# Patient Record
Sex: FEMALE | Race: WHITE | NOT HISPANIC OR LATINO | Employment: OTHER | URBAN - NONMETROPOLITAN AREA
[De-identification: names, ages, dates, MRNs, and addresses within clinical notes are randomized per-mention and may not be internally consistent; named-entity substitution may affect disease eponyms.]

---

## 2023-02-24 PROBLEM — I10 UNCONTROLLED HYPERTENSION: Status: ACTIVE | Noted: 2023-02-24

## 2023-02-24 PROBLEM — B35.1 MYCOTIC TOENAILS: Status: ACTIVE | Noted: 2023-02-24

## 2023-02-24 PROBLEM — K21.9 GERD (GASTROESOPHAGEAL REFLUX DISEASE): Status: ACTIVE | Noted: 2023-02-24

## 2023-02-24 PROBLEM — S39.012A LUMBAR STRAIN: Status: ACTIVE | Noted: 2023-02-24

## 2023-02-24 PROBLEM — M54.50 LUMBAR PAIN: Status: ACTIVE | Noted: 2023-02-24

## 2023-02-24 PROBLEM — M79.672 BILATERAL LEG AND FOOT PAIN: Status: ACTIVE | Noted: 2023-02-24

## 2023-02-24 PROBLEM — R29.6 RECURRENT FALLS: Status: ACTIVE | Noted: 2023-02-24

## 2023-02-24 PROBLEM — B37.2 CANDIDIASIS, INTERTRIGO: Status: ACTIVE | Noted: 2023-02-24

## 2023-02-24 PROBLEM — E03.9 HYPOTHYROIDISM: Status: ACTIVE | Noted: 2023-02-24

## 2023-02-24 PROBLEM — M25.561 RIGHT KNEE PAIN: Status: ACTIVE | Noted: 2023-02-24

## 2023-02-24 PROBLEM — L03.90 CELLULITIS: Status: ACTIVE | Noted: 2023-02-24

## 2023-02-24 PROBLEM — M85.80 OSTEOPENIA: Status: ACTIVE | Noted: 2023-02-24

## 2023-02-24 PROBLEM — E87.6 HYPOKALEMIA: Status: ACTIVE | Noted: 2023-02-24

## 2023-02-24 PROBLEM — F32.A DEPRESSION: Status: ACTIVE | Noted: 2023-02-24

## 2023-02-24 PROBLEM — I83.893 VARICOSE VEINS OF BILATERAL LOWER EXTREMITIES WITH OTHER COMPLICATIONS: Status: ACTIVE | Noted: 2023-02-24

## 2023-02-24 PROBLEM — M53.3 PAIN OF RIGHT SACROILIAC JOINT: Status: ACTIVE | Noted: 2023-02-24

## 2023-02-24 PROBLEM — M79.604 BILATERAL LEG AND FOOT PAIN: Status: ACTIVE | Noted: 2023-02-24

## 2023-02-24 PROBLEM — F32.4 MAJOR DEPRESSIVE DISORDER WITH SINGLE EPISODE, IN PARTIAL REMISSION (CMS-HCC): Status: ACTIVE | Noted: 2023-02-24

## 2023-02-24 PROBLEM — M79.605 BILATERAL LEG AND FOOT PAIN: Status: ACTIVE | Noted: 2023-02-24

## 2023-02-24 PROBLEM — E87.6 LOW SERUM POTASSIUM: Status: ACTIVE | Noted: 2023-02-24
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
## 2023-02-24 PROBLEM — K57.92 DIVERTICULITIS: Status: ACTIVE | Noted: 2023-02-24

## 2023-02-24 PROBLEM — S39.012A SACROILIAC STRAIN: Status: ACTIVE | Noted: 2023-02-24

## 2023-02-24 PROBLEM — F33.0 MILD EPISODE OF RECURRENT MAJOR DEPRESSIVE DISORDER (CMS-HCC): Status: ACTIVE | Noted: 2023-02-24

## 2023-02-24 PROBLEM — R51.9 HEADACHE: Status: ACTIVE | Noted: 2023-02-24

## 2023-02-24 PROBLEM — G47.00 INSOMNIA: Status: ACTIVE | Noted: 2023-02-24

## 2023-02-24 PROBLEM — S22.080A T12 COMPRESSION FRACTURE, INITIAL ENCOUNTER (MULTI): Status: ACTIVE | Noted: 2023-02-24

## 2023-02-24 PROBLEM — R25.2 LEG CRAMPING: Status: ACTIVE | Noted: 2023-02-24

## 2023-02-24 PROBLEM — M79.671 BILATERAL LEG AND FOOT PAIN: Status: ACTIVE | Noted: 2023-02-24

## 2023-02-24 PROBLEM — M17.11 PRIMARY OSTEOARTHRITIS OF RIGHT KNEE: Status: ACTIVE | Noted: 2023-02-24

## 2023-02-24 PROBLEM — M25.551 RIGHT HIP PAIN: Status: ACTIVE | Noted: 2023-02-24

## 2023-02-24 RX ORDER — CITALOPRAM 10 MG/1
1 TABLET ORAL DAILY
COMMUNITY
Start: 2016-08-26

## 2023-02-24 RX ORDER — LEVOTHYROXINE SODIUM 175 UG/1
1 TABLET ORAL
COMMUNITY
Start: 2014-06-23 | End: 2023-04-06 | Stop reason: SDUPTHER

## 2023-02-24 RX ORDER — TRAZODONE HYDROCHLORIDE 50 MG/1
TABLET ORAL
COMMUNITY
Start: 2022-01-20 | End: 2023-04-06 | Stop reason: SDUPTHER

## 2023-02-24 RX ORDER — ACETAMINOPHEN 500 MG
TABLET ORAL
COMMUNITY
Start: 2021-12-10 | End: 2023-07-27 | Stop reason: ALTCHOICE

## 2023-02-24 RX ORDER — POTASSIUM CHLORIDE 750 MG/1
1 TABLET, FILM COATED, EXTENDED RELEASE ORAL DAILY
COMMUNITY
Start: 2021-10-21 | End: 2024-01-24 | Stop reason: ALTCHOICE

## 2023-03-29 PROBLEM — F33.0 MILD EPISODE OF RECURRENT MAJOR DEPRESSIVE DISORDER (CMS-HCC): Status: RESOLVED | Noted: 2023-02-24 | Resolved: 2023-03-29

## 2023-04-06 ENCOUNTER — OFFICE VISIT (OUTPATIENT)
Dept: PRIMARY CARE | Facility: CLINIC | Age: 78
End: 2023-04-06
Payer: MEDICARE

## 2023-04-06 VITALS
SYSTOLIC BLOOD PRESSURE: 151 MMHG | HEART RATE: 81 BPM | DIASTOLIC BLOOD PRESSURE: 79 MMHG | WEIGHT: 111.8 LBS | HEIGHT: 65 IN | BODY MASS INDEX: 18.63 KG/M2 | RESPIRATION RATE: 18 BRPM | OXYGEN SATURATION: 98 %

## 2023-04-06 DIAGNOSIS — E87.6 LOW SERUM POTASSIUM: ICD-10-CM

## 2023-04-06 DIAGNOSIS — R03.0 ELEVATED BLOOD PRESSURE READING: ICD-10-CM

## 2023-04-06 DIAGNOSIS — R25.2 LEG CRAMPING: ICD-10-CM
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
## 2023-04-06 DIAGNOSIS — F32.4 MAJOR DEPRESSIVE DISORDER WITH SINGLE EPISODE, IN PARTIAL REMISSION (CMS-HCC): ICD-10-CM

## 2023-04-06 DIAGNOSIS — E03.9 HYPOTHYROIDISM, UNSPECIFIED TYPE: ICD-10-CM

## 2023-04-06 DIAGNOSIS — M85.80 OSTEOPENIA, UNSPECIFIED LOCATION: ICD-10-CM

## 2023-04-06 DIAGNOSIS — S22.080A T12 COMPRESSION FRACTURE, INITIAL ENCOUNTER (MULTI): ICD-10-CM

## 2023-04-06 PROCEDURE — 1160F RVW MEDS BY RX/DR IN RCRD: CPT | Performed by: PHYSICIAN ASSISTANT

## 2023-04-06 PROCEDURE — 3077F SYST BP >= 140 MM HG: CPT | Performed by: PHYSICIAN ASSISTANT

## 2023-04-06 PROCEDURE — 1036F TOBACCO NON-USER: CPT | Performed by: PHYSICIAN ASSISTANT

## 2023-04-06 PROCEDURE — 99213 OFFICE O/P EST LOW 20 MIN: CPT | Performed by: PHYSICIAN ASSISTANT

## 2023-04-06 PROCEDURE — 3078F DIAST BP <80 MM HG: CPT | Performed by: PHYSICIAN ASSISTANT

## 2023-04-06 PROCEDURE — 1159F MED LIST DOCD IN RCRD: CPT | Performed by: PHYSICIAN ASSISTANT

## 2023-04-06 RX ORDER — TRAZODONE HYDROCHLORIDE 50 MG/1
50 TABLET ORAL NIGHTLY
Qty: 90 TABLET | Refills: 1 | Status: SHIPPED | OUTPATIENT
Start: 2023-04-06 | End: 2023-11-28 | Stop reason: SDUPTHER

## 2023-04-06 RX ORDER — LEVOTHYROXINE SODIUM 175 UG/1
175 TABLET ORAL
Qty: 90 TABLET | Refills: 1 | Status: SHIPPED | OUTPATIENT
Start: 2023-04-06 | End: 2024-05-16

## 2023-04-06 ASSESSMENT — PAIN SCALES - GENERAL: PAINLEVEL: 2

## 2023-04-06 NOTE — PROGRESS NOTES
Subjective   Patient ID:   Stacy Galicia is a 77 y.o. female who presents for Follow-up.  HPI  Fall:  Fell on steps in May 2022.  I gave Prednisone.  Left shoulder hurt.  Did not pass out.  Does not think she broke anything.    Leg cramping/Hypokalemia:  Potassium was low in Oct 2021.  This was improved in Jan 2022 and in Oct 2022.  DUE for re-check.    Elevated BP:  BP is high today at 151/79.  It did not improve on re-check.  No history of HTN.  Not on BP medication.    Hypothyroidism:  Taking Synthroid 175mcg.  Has been non-compliant in the past.  Last checked Oct 2022.  DUE for re-check.    Osteopenia:  Taking Calcium and Vitamin D.    Depression:  Taking Celexa.  Denies SI/HI.    Insomnia:  Taking Trazodone.  Has tried and failed Remeron.    Frequent cat scratches/bites:  Pt has been seen numerous times this year for this.  States her cat is not diabetic and no longer has to give her injections.    T12 compression fracture:  Seen on CT in Dec 2022.  Stable.    Health maintenance:  Smoking: Never a smoker.  Mammogram (40-75): Oct 2021. DUE  Labs: Oct 2022. DUE for TSH, CMP  DEXA (65+, q 2 years): Sep 2020.  Prevnar 13 (65+):  Pneumovax 23 (65+, 1 year after Prev 13): 2013  Influenza:     Review of Systems  12 point review of systems negative unless stated above in HPI    Vitals:    04/06/23 1405   BP: 151/79   Pulse: 81   Resp: 18   SpO2: 98%       Physical Exam  General: Alert and oriented, well nourished, no acute distress.  Lungs: Clear to auscultation, non-labored respiration.  Heart: Normal rate, regular rhythm, no murmur, gallop or edema.  Neurologic: Awake, alert, and oriented X3, CN II-XII intact.  Psychiatric: Cooperative, appropriate mood and affect.    Assessment/Plan   I am glad you are well!  I have ordered some labs to be done as soon as you can.  We will call you with the results.  BP is up today - we will see what it is at next visit.  Continue the same medications.  Chronic conditions are  stable.  Call with questions or concerns.    Fu 1-2 months for BP    Diagnoses and all orders for this visit:  Fall, sequela  T12 compression fracture, initial encounter (CMS/[facility])  Major depressive disorder with single episode, in partial remission (CMS/[facility])  -     traZODone (Desyrel) 50 mg tablet; Take 1 tablet (50 mg) by mouth once daily at bedtime.  Low serum potassium  -     Comprehensive Metabolic Panel; Future  Leg cramping  -     Comprehensive Metabolic Panel; Future  Hypothyroidism, unspecified type  -     TSH with reflex to Free T4 if abnormal; Future  -     levothyroxine (Synthroid, Levoxyl) 175 mcg tablet; Take 1 tablet (175 mcg) by mouth once daily in the morning. Take before meals.  Osteopenia, unspecified location  Elevated blood pressure reading

## 2023-04-10 ENCOUNTER — LAB (OUTPATIENT)
Dept: LAB | Facility: LAB | Age: 78
End: 2023-04-10
Payer: MEDICARE

## 2023-04-10 DIAGNOSIS — E87.6 LOW SERUM POTASSIUM: ICD-10-CM

## 2023-04-10 DIAGNOSIS — R25.2 LEG CRAMPING: ICD-10-CM

## 2023-04-10 DIAGNOSIS — E03.9 HYPOTHYROIDISM, UNSPECIFIED TYPE: ICD-10-CM

## 2023-04-10 LAB
THYROTROPIN (MIU/L) IN SER/PLAS BY DETECTION LIMIT <= 0.05 MIU/L: 7.42 MIU/L (ref 0.44–3.98)
THYROXINE (T4) FREE (NG/DL) IN SER/PLAS: NORMAL

## 2023-04-10 PROCEDURE — 36415 COLL VENOUS BLD VENIPUNCTURE: CPT

## 2023-04-10 PROCEDURE — 84443 ASSAY THYROID STIM HORMONE: CPT

## 2023-04-11 LAB — THYROXINE (T4) FREE (NG/DL) IN SER/PLAS: NORMAL

## 2023-04-12 DIAGNOSIS — E03.9 HYPOTHYROIDISM, UNSPECIFIED TYPE: ICD-10-CM

## 2023-04-12 DIAGNOSIS — R79.89 ABNORMAL THYROID BLOOD TEST: ICD-10-CM

## 2023-04-17 ENCOUNTER — LAB (OUTPATIENT)
Dept: LAB | Facility: LAB | Age: 78
End: 2023-04-17
Payer: MEDICARE

## 2023-04-17 DIAGNOSIS — E03.9 HYPOTHYROIDISM, UNSPECIFIED TYPE: ICD-10-CM

## 2023-04-17 DIAGNOSIS — R79.89 ABNORMAL THYROID BLOOD TEST: ICD-10-CM

## 2023-04-17 LAB — THYROXINE (T4) FREE (NG/DL) IN SER/PLAS: 1.14 NG/DL (ref 0.78–1.48)

## 2023-04-17 PROCEDURE — 36415 COLL VENOUS BLD VENIPUNCTURE: CPT

## 2023-04-17 PROCEDURE — 84439 ASSAY OF FREE THYROXINE: CPT

## 2023-06-14 NOTE — PROGRESS NOTES
Subjective   Patient ID:   Stacy Galicia is a 77 y.o. female who presents for No chief complaint on file..  HPI  Fall:  Fell on steps in May 2022.  I gave Prednisone.  Left shoulder hurt.  Did not pass out.  Does not think she broke anything.  This has resolved.    Leg cramping/Hypokalemia:  Potassium was low in Oct 2021.  This was improved in Jan 2022 and in Oct 2022.    Elevated BP:  BP was elevated last visit.  It did not improve on re-check.  BP today is  No history of HTN.  Not on BP medication.    Hypothyroidism:  Taking Synthroid 175mcg.  Has been non-compliant in the past.  Last checked April 2023.    Osteopenia:  Taking Calcium and Vitamin D.    Depression:  Taking Celexa.  Denies SI/HI.    Insomnia:  Taking Trazodone.  Has tried and failed Remeron.    Frequent cat scratches/bites:  Pt has been seen numerous times this year for this.  States her cat is not diabetic and no longer has to give her injections.    T12 compression fracture:  Seen on CT in Dec 2022.  Stable.    Health maintenance:  Smoking: Never a smoker.  Mammogram (40-75): Oct 2021. DUE  Labs: Oct 2022.  DEXA (65+, q 2 years): Sep 2020.  Prevnar 13 (65+):  Pneumovax 23 (65+, 1 year after Prev 13): 2013  Influenza:     Review of Systems  12 point review of systems negative unless stated above in HPI    There were no vitals filed for this visit.      Physical Exam  General: Alert and oriented, well nourished, no acute distress.  Lungs: Clear to auscultation, non-labored respiration.  Heart: Normal rate, regular rhythm, no murmur, gallop or edema.  Neurologic: Awake, alert, and oriented X3, CN II-XII intact.  Psychiatric: Cooperative, appropriate mood and affect.    Assessment/Plan   Diagnoses and all orders for this visit:  Medicare annual wellness visit, subsequent  Depression screening  Fall, sequela  T12 compression fracture, initial encounter (CMS/[facility])  Major depressive disorder with single episode, in partial remission (CMS/[facility])  Low serum  potassium  Leg cramping  Hypothyroidism, unspecified type  Osteopenia, unspecified location  Elevated blood pressure reading  Visit for screening mammogram

## 2023-06-19 NOTE — PROGRESS NOTES
Subjective   Patient ID:   Stacy Galicia is a 77 y.o. female who presents for No chief complaint on file..  HPI  Fall:  Fell on steps in May 2022.  I gave Prednisone.  Left shoulder hurt.  Did not pass out.  Does not think she broke anything.  This has resolved.    Leg cramping/Hypokalemia:  Potassium was low in Oct 2021.  This was improved in Jan 2022 and in Oct 2022.    Elevated BP:  BP was elevated last visit.  It did not improve on re-check.  BP today is  No history of HTN.  Not on BP medication.    Hypothyroidism:  Taking Synthroid 175mcg.  Has been non-compliant in the past.  Last checked April 2023.    Osteopenia:  Taking Calcium and Vitamin D.    Depression:  Taking Celexa.  Denies SI/HI.    Insomnia:  Taking Trazodone.  Has tried and failed Remeron.    Frequent cat scratches/bites:  Pt has been seen numerous times this year for this.  States her cat is not diabetic and no longer has to give her injections.    T12 compression fracture:  Seen on CT in Dec 2022.  Stable.    Health maintenance:  Smoking: Never a smoker.  Mammogram (40-75): Oct 2021. DUE  Labs: Oct 2022.  DEXA (65+, q 2 years): Sep 2020.  Prevnar 13 (65+):  Pneumovax 23 (65+, 1 year after Prev 13): 2013  Influenza:     Review of Systems  12 point review of systems negative unless stated above in HPI    There were no vitals filed for this visit.      Physical Exam  General: Alert and oriented, well nourished, no acute distress.  Lungs: Clear to auscultation, non-labored respiration.  Heart: Normal rate, regular rhythm, no murmur, gallop or edema.  Neurologic: Awake, alert, and oriented X3, CN II-XII intact.  Psychiatric: Cooperative, appropriate mood and affect.    Assessment/Plan   Diagnoses and all orders for this visit:  Medicare annual wellness visit, subsequent  Depression screening  Fall, sequela  T12 compression fracture, initial encounter (CMS/[facility])  Major depressive disorder with single episode, in partial remission (CMS/[facility])  Low serum  potassium  Leg cramping  Osteopenia, unspecified location  Hypothyroidism, unspecified type  Elevated blood pressure reading

## 2023-06-22 ENCOUNTER — APPOINTMENT (OUTPATIENT)
Dept: PRIMARY CARE | Facility: CLINIC | Age: 78
End: 2023-06-22
Payer: MEDICARE

## 2023-06-27 ENCOUNTER — OFFICE VISIT (OUTPATIENT)
Dept: PRIMARY CARE | Facility: CLINIC | Age: 78
End: 2023-06-27
Payer: MEDICARE

## 2023-06-27 VITALS
OXYGEN SATURATION: 96 % | DIASTOLIC BLOOD PRESSURE: 84 MMHG | BODY MASS INDEX: 18.29 KG/M2 | HEART RATE: 75 BPM | WEIGHT: 109.8 LBS | SYSTOLIC BLOOD PRESSURE: 166 MMHG | HEIGHT: 65 IN

## 2023-06-27 DIAGNOSIS — Z12.31 VISIT FOR SCREENING MAMMOGRAM: ICD-10-CM

## 2023-06-27 DIAGNOSIS — E87.6 LOW SERUM POTASSIUM: ICD-10-CM
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
## 2023-06-27 DIAGNOSIS — I10 PRIMARY HYPERTENSION: ICD-10-CM

## 2023-06-27 DIAGNOSIS — Z00.00 ROUTINE GENERAL MEDICAL EXAMINATION AT HEALTH CARE FACILITY: ICD-10-CM

## 2023-06-27 DIAGNOSIS — R53.1 WEAKNESS: ICD-10-CM

## 2023-06-27 DIAGNOSIS — F32.4 MAJOR DEPRESSIVE DISORDER WITH SINGLE EPISODE, IN PARTIAL REMISSION (CMS-HCC): ICD-10-CM

## 2023-06-27 DIAGNOSIS — R25.2 LEG CRAMPING: ICD-10-CM

## 2023-06-27 DIAGNOSIS — Z00.00 MEDICARE ANNUAL WELLNESS VISIT, SUBSEQUENT: Primary | ICD-10-CM

## 2023-06-27 DIAGNOSIS — R03.0 ELEVATED BLOOD PRESSURE READING: ICD-10-CM

## 2023-06-27 DIAGNOSIS — M85.80 OSTEOPENIA, UNSPECIFIED LOCATION: ICD-10-CM

## 2023-06-27 DIAGNOSIS — Z13.31 DEPRESSION SCREENING: ICD-10-CM

## 2023-06-27 DIAGNOSIS — S22.080A T12 COMPRESSION FRACTURE, INITIAL ENCOUNTER (MULTI): ICD-10-CM

## 2023-06-27 DIAGNOSIS — E03.9 HYPOTHYROIDISM, UNSPECIFIED TYPE: ICD-10-CM

## 2023-06-27 DIAGNOSIS — R68.83 CHILLS: ICD-10-CM

## 2023-06-27 PROBLEM — R11.0 NAUSEA: Status: RESOLVED | Noted: 2023-06-27 | Resolved: 2023-06-27

## 2023-06-27 PROBLEM — M54.9 BACK PAIN: Status: ACTIVE | Noted: 2023-02-24

## 2023-06-27 PROBLEM — I83.90 VARICOSE VEIN OF LEG: Status: ACTIVE | Noted: 2023-02-24

## 2023-06-27 PROBLEM — T63.441A BEE STING: Status: RESOLVED | Noted: 2023-06-27 | Resolved: 2023-06-27

## 2023-06-27 PROBLEM — J02.9 SORE THROAT: Status: RESOLVED | Noted: 2023-06-27 | Resolved: 2023-06-27

## 2023-06-27 PROCEDURE — 99213 OFFICE O/P EST LOW 20 MIN: CPT | Performed by: PHYSICIAN ASSISTANT

## 2023-06-27 PROCEDURE — 1036F TOBACCO NON-USER: CPT | Performed by: PHYSICIAN ASSISTANT

## 2023-06-27 PROCEDURE — 3079F DIAST BP 80-89 MM HG: CPT | Performed by: PHYSICIAN ASSISTANT

## 2023-06-27 PROCEDURE — G0444 DEPRESSION SCREEN ANNUAL: HCPCS | Performed by: PHYSICIAN ASSISTANT

## 2023-06-27 PROCEDURE — 1159F MED LIST DOCD IN RCRD: CPT | Performed by: PHYSICIAN ASSISTANT

## 2023-06-27 PROCEDURE — 1160F RVW MEDS BY RX/DR IN RCRD: CPT | Performed by: PHYSICIAN ASSISTANT

## 2023-06-27 PROCEDURE — 3077F SYST BP >= 140 MM HG: CPT | Performed by: PHYSICIAN ASSISTANT

## 2023-06-27 PROCEDURE — G0439 PPPS, SUBSEQ VISIT: HCPCS | Performed by: PHYSICIAN ASSISTANT

## 2023-06-27 PROCEDURE — 1170F FXNL STATUS ASSESSED: CPT | Performed by: PHYSICIAN ASSISTANT

## 2023-06-27 RX ORDER — LISINOPRIL 5 MG/1
5 TABLET ORAL DAILY
Qty: 30 TABLET | Refills: 1 | Status: SHIPPED | OUTPATIENT
Start: 2023-06-27 | End: 2024-01-24 | Stop reason: ALTCHOICE

## 2023-06-27 ASSESSMENT — ACTIVITIES OF DAILY LIVING (ADL)
DRESSING: INDEPENDENT
GROCERY_SHOPPING: INDEPENDENT
TAKING_MEDICATION: INDEPENDENT
DOING_HOUSEWORK: INDEPENDENT
BATHING: INDEPENDENT
MANAGING_FINANCES: INDEPENDENT

## 2023-06-27 ASSESSMENT — PATIENT HEALTH QUESTIONNAIRE - PHQ9
2. FEELING DOWN, DEPRESSED OR HOPELESS: NOT AT ALL
1. LITTLE INTEREST OR PLEASURE IN DOING THINGS: NOT AT ALL
SUM OF ALL RESPONSES TO PHQ9 QUESTIONS 1 AND 2: 0

## 2023-06-27 NOTE — PROGRESS NOTES
"Subjective   Reason for Visit: Stacy Galicia is an 77 y.o. female here for a Medicare Wellness visit.     Past Medical, Surgical, and Family History reviewed and updated in chart.    Reviewed all medications by prescribing practitioner or clinical pharmacist (such as prescriptions, OTCs, herbal therapies and supplements) and documented in the medical record.    HPI    Patient Care Team:  Froylan Hart PA-C as PCP - General     ED follow up:  Symptoms x 5 days.  Was in the ED x 2 this week.  Was in the ED on 6/24 and 6/26.  ED notes reviewed.  Was having chills/weakness.  Urine, labs, CXR, COVID tests were all negative.  Feeling a little better.  Still feeling tired.    Fall:  Fell on steps in May 2022.  I gave Prednisone.  Left shoulder hurt.  Did not pass out.  Does not think she broke anything.  This has resolved.    Leg cramping/Hypokalemia:  Potassium was low in Oct 2021.  This was improved in Jan 2022 and in Oct 2022.    Elevated BP:  BP was elevated last visit.  It did not improve on re-check.  BP today is 166/84.  No history of HTN.  Not on BP medication.    Hypothyroidism:  Taking Synthroid 175mcg.  Has been non-compliant in the past.  Last checked April 2023.    Osteopenia:  Taking Calcium and Vitamin D.    Depression:  Taking Celexa.  Denies SI/HI.    Insomnia:  Taking Trazodone.  Has tried and failed Remeron.    Frequent cat scratches/bites:  Pt has been seen numerous times this year for this.  States her cat is not diabetic and no longer has to give her injections.    T12 compression fracture:  Seen on CT in Dec 2022.  Stable.    Health maintenance:  Smoking: Never a smoker.  Mammogram (40-75): Oct 2021. DUE  Labs: Oct 2022.  DEXA (65+, q 2 years): Sep 2020.  Prevnar 13 (65+):  Pneumovax 23 (65+, 1 year after Prev 13): 2013  Influenza:     Review of Systems  12 point review of systems negative unless stated above in HPI    Objective   Vitals:  /84   Pulse 75   Ht 1.651 m (5' 5\")   Wt 49.8 kg " (109 lb 12.8 oz)   SpO2 96%   BMI 18.27 kg/m²       Physical Exam  General: Alert and oriented, well nourished, no acute distress.  Lungs: Clear to auscultation, non-labored respiration.  Heart: Normal rate, regular rhythm, no murmur, gallop or edema.  Neurologic: Awake, alert, and oriented X3, CN II-XII intact.  Psychiatric: Cooperative, appropriate mood and affect.    Assessment/Plan   This counts as your annual Medicare Wellness visit.  Health maintenance was reviewed today.  Depression screening is negative.  I am sorry to hear you aren't feeling well!  Please let me know if your symptoms get any worse and we can do a trial of antibiotics.  I have ordered you a mammogram to be done as soon as you are able.  We will call the results.  It does appear you have high blood pressure.  I have sent in Lisinopril to start for this.  If symptoms persist or worsen despite current plan of care, please contact your healthcare provider for further evaluation.  Patient instructed to contact the office if there are any questions regarding their care or treatment.   [facility] [phone].  [facility] [phone].  Continue the same medications.  Chronic conditions are stable.  Call with questions or concerns.    F/u  1-2 months  Problem List Items Addressed This Visit          Circulatory    Primary hypertension    Elevated blood pressure reading    Relevant Medications    lisinopril 5 mg tablet       Musculoskeletal    Leg cramping    Osteopenia    T12 compression fracture, initial encounter (CMS/[facility])       Endocrine/Metabolic    Hypothyroidism       Other    Fall    Low serum potassium    Major depressive disorder with single episode, in partial remission (CMS/[facility])     Other Visit Diagnoses       Medicare annual wellness visit, subsequent    -  Primary    Depression screening        Visit for screening mammogram        Relevant Orders    BI mammo bilateral screening tomosynthesis    Routine  general medical examination at health care facility        Weakness        Chills

## 2023-06-29 ENCOUNTER — OFFICE VISIT (OUTPATIENT)
Dept: PRIMARY CARE | Facility: CLINIC | Age: 78
End: 2023-06-29
Payer: MEDICARE

## 2023-06-29 VITALS
RESPIRATION RATE: 18 BRPM | WEIGHT: 111 LBS | OXYGEN SATURATION: 97 % | DIASTOLIC BLOOD PRESSURE: 78 MMHG | HEART RATE: 76 BPM | BODY MASS INDEX: 18.49 KG/M2 | HEIGHT: 65 IN | SYSTOLIC BLOOD PRESSURE: 156 MMHG

## 2023-06-29 DIAGNOSIS — L03.211 CELLULITIS OF FACE: ICD-10-CM

## 2023-06-29 DIAGNOSIS — R22.0 FACIAL SWELLING: Primary | ICD-10-CM

## 2023-06-29 DIAGNOSIS — R21 FACIAL RASH: ICD-10-CM

## 2023-06-29 PROCEDURE — 1160F RVW MEDS BY RX/DR IN RCRD: CPT | Performed by: PHYSICIAN ASSISTANT

## 2023-06-29 PROCEDURE — 3077F SYST BP >= 140 MM HG: CPT | Performed by: PHYSICIAN ASSISTANT

## 2023-06-29 PROCEDURE — 99213 OFFICE O/P EST LOW 20 MIN: CPT | Performed by: PHYSICIAN ASSISTANT

## 2023-06-29 PROCEDURE — 3078F DIAST BP <80 MM HG: CPT | Performed by: PHYSICIAN ASSISTANT

## 2023-06-29 PROCEDURE — 1036F TOBACCO NON-USER: CPT | Performed by: PHYSICIAN ASSISTANT

## 2023-06-29 PROCEDURE — 1159F MED LIST DOCD IN RCRD: CPT | Performed by: PHYSICIAN ASSISTANT

## 2023-06-29 RX ORDER — CEPHALEXIN 500 MG/1
500 CAPSULE ORAL 2 TIMES DAILY
Qty: 14 CAPSULE | Refills: 0 | Status: SHIPPED | OUTPATIENT
Start: 2023-06-29 | End: 2023-07-06

## 2023-06-29 RX ORDER — PREDNISONE 20 MG/1
TABLET ORAL
Qty: 9 TABLET | Refills: 0 | Status: SHIPPED | OUTPATIENT
Start: 2023-06-29 | End: 2023-07-27 | Stop reason: ALTCHOICE

## 2023-06-29 ASSESSMENT — PAIN SCALES - GENERAL: PAINLEVEL: 4

## 2023-06-29 NOTE — PROGRESS NOTES
Subjective   Patient ID:   Stacy Galicia is a 77 y.o. female who presents for Rash (ON FACE ).  HPI  Here with acute symptoms:  Symptoms x 1-2 days.  Rash on face.  Right sided facial swelling.  Itchy.  Thinks she may have gotten bit by a bug.  No contact with any plants.    Review of Systems  12 point review of systems negative unless stated above in HPI    Vitals:    06/29/23 1331   BP: 156/78   Pulse: 76   Resp: 18   SpO2: 97%       Physical Exam  General: Alert and oriented, well nourished, no acute distress.  Lungs: Clear to auscultation, non-labored respiration.  Heart: Normal rate, regular rhythm, no murmur, gallop or edema.  Neurologic: Awake, alert, and oriented X3, CN II-XII intact.  Psychiatric: Cooperative, appropriate mood and affect.  Skin: Right side of face under her eye is swollen and red.    Assessment/Plan   This looks to be cellulitis/bug bite.  I have sent in Keflex and Prednisone to treat.  If symptoms persist or worsen despite current plan of care, please contact your healthcare provider for further evaluation.  Patient instructed to contact the office if there are any questions regarding their care or treatment.   [facility] [phone].  [facility] [phone].    Fu as scheduled  Diagnoses and all orders for this visit:  Facial swelling  Facial rash  -     predniSONE (Deltasone) 20 mg tablet; TAKE 2 TABLETS BY MOUTH DAYS 1-3, THEN 1 TABLET BY MOUTH DAYS 4-6.  -     cephalexin (Keflex) 500 mg capsule; Take 1 capsule (500 mg) by mouth 2 times a day for 7 days.  Cellulitis of face

## 2023-07-18 NOTE — PROGRESS NOTES
"Subjective   Reason for Visit: Stacy Galicia is an 78 y.o. female here for a follow up visit.     HPI    Patient Care Team:  Froylan Hart PA-C as PCP - General   Facial rash:  I gave Keflex and Prednisone last visit.    Fall:  Fell on steps in May 2022.  I gave Prednisone.  Left shoulder hurt.  Did not pass out.  Does not think she broke anything.  This has resolved.    Leg cramping/Hypokalemia:  Potassium was low in Oct 2021.  This was improved in Jan 2022 and in Oct 2022.    HTN:  We started Lisinopril last visit - she is not taking this.  BP today is 172/92.  No history of HTN.    Hypothyroidism:  Taking Synthroid 175mcg.  Has been non-compliant in the past.  Last checked April 2023.    Osteopenia:  Taking Calcium and Vitamin D.    Depression:  Taking Celexa.  Denies SI/HI.    Insomnia:  Taking Trazodone.  Has tried and failed Remeron.    Frequent cat scratches/bites:  Pt has been seen numerous times this year for this.  States her cat is not diabetic and no longer has to give her injections.    T12 compression fracture:  Seen on CT in Dec 2022.  Stable.    Health maintenance:  Smoking: Never a smoker.  Mammogram (40-75): Oct 2021. DUE - ordered  Labs: Oct 2022.  DEXA (65+, q 2 years): Sep 2020.  Prevnar 13 (65+):  Pneumovax 23 (65+, 1 year after Prev 13): 2013  Influenza:     Review of Systems  12 point review of systems negative unless stated above in HPI    Objective   Vitals:  BP (!) 172/92   Pulse 73   Resp 18   Ht 1.651 m (5' 5\")   Wt 49.3 kg (108 lb 9.6 oz)   SpO2 95%   BMI 18.07 kg/m²       Physical Exam  General: Alert and oriented, well nourished, no acute distress.  Lungs: Clear to auscultation, non-labored respiration.  Heart: Normal rate, regular rhythm, no murmur, gallop or edema.  Neurologic: Awake, alert, and oriented X3, CN II-XII intact.  Psychiatric: Cooperative, appropriate mood and affect.    Assessment/Plan   Pt states she does not want BP medication.  I advised against this and " recommended she restart her Lisinopril.  She declined.  We will do yearly labs next visit.  Continue the same medications.  Chronic conditions are stable.  Call with questions or concerns.    F/u  3-4 months  Problem List Items Addressed This Visit          Cardiac and Vasculature    Primary hypertension       Endocrine/Metabolic    Hypothyroidism       Genitourinary and Reproductive    Low serum potassium       Infectious Diseases    Cellulitis       Mental Health    Major depressive disorder with single episode, in partial remission (CMS/[facility])       Musculoskeletal and Injuries    Fall    Osteopenia       Symptoms and Signs    Leg cramping       Other    T12 compression fracture, initial encounter (CMS/[facility])     Other Visit Diagnoses       Facial rash    -  Primary

## 2023-07-27 ENCOUNTER — OFFICE VISIT (OUTPATIENT)
Dept: PRIMARY CARE | Facility: CLINIC | Age: 78
End: 2023-07-27
Payer: MEDICARE

## 2023-07-27 VITALS
RESPIRATION RATE: 18 BRPM | OXYGEN SATURATION: 95 % | BODY MASS INDEX: 18.09 KG/M2 | HEART RATE: 73 BPM | SYSTOLIC BLOOD PRESSURE: 172 MMHG | HEIGHT: 65 IN | WEIGHT: 108.6 LBS | DIASTOLIC BLOOD PRESSURE: 92 MMHG

## 2023-07-27 DIAGNOSIS — R21 FACIAL RASH: Primary | ICD-10-CM

## 2023-07-27 DIAGNOSIS — M85.80 OSTEOPENIA, UNSPECIFIED LOCATION: ICD-10-CM

## 2023-07-27 DIAGNOSIS — S22.080A T12 COMPRESSION FRACTURE, INITIAL ENCOUNTER (MULTI): ICD-10-CM

## 2023-07-27 DIAGNOSIS — E87.6 LOW SERUM POTASSIUM: ICD-10-CM
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
## 2023-07-27 DIAGNOSIS — E03.9 HYPOTHYROIDISM, UNSPECIFIED TYPE: ICD-10-CM

## 2023-07-27 DIAGNOSIS — L03.211 CELLULITIS OF FACE: ICD-10-CM

## 2023-07-27 DIAGNOSIS — I10 PRIMARY HYPERTENSION: ICD-10-CM

## 2023-07-27 DIAGNOSIS — F32.4 MAJOR DEPRESSIVE DISORDER WITH SINGLE EPISODE, IN PARTIAL REMISSION (CMS-HCC): ICD-10-CM

## 2023-07-27 DIAGNOSIS — R25.2 LEG CRAMPING: ICD-10-CM

## 2023-07-27 PROCEDURE — 3080F DIAST BP >= 90 MM HG: CPT | Performed by: PHYSICIAN ASSISTANT

## 2023-07-27 PROCEDURE — 3077F SYST BP >= 140 MM HG: CPT | Performed by: PHYSICIAN ASSISTANT

## 2023-07-27 PROCEDURE — 1159F MED LIST DOCD IN RCRD: CPT | Performed by: PHYSICIAN ASSISTANT

## 2023-07-27 PROCEDURE — 99213 OFFICE O/P EST LOW 20 MIN: CPT | Performed by: PHYSICIAN ASSISTANT

## 2023-07-27 PROCEDURE — 1036F TOBACCO NON-USER: CPT | Performed by: PHYSICIAN ASSISTANT

## 2023-07-27 PROCEDURE — 1160F RVW MEDS BY RX/DR IN RCRD: CPT | Performed by: PHYSICIAN ASSISTANT

## 2023-07-27 PROCEDURE — 1126F AMNT PAIN NOTED NONE PRSNT: CPT | Performed by: PHYSICIAN ASSISTANT

## 2023-07-27 ASSESSMENT — PAIN SCALES - GENERAL: PAINLEVEL: 0-NO PAIN

## 2023-08-11 ENCOUNTER — OFFICE VISIT (OUTPATIENT)
Dept: PRIMARY CARE | Facility: CLINIC | Age: 78
End: 2023-08-11
Payer: MEDICARE

## 2023-08-11 VITALS
SYSTOLIC BLOOD PRESSURE: 159 MMHG | DIASTOLIC BLOOD PRESSURE: 82 MMHG | OXYGEN SATURATION: 96 % | BODY MASS INDEX: 17.99 KG/M2 | RESPIRATION RATE: 18 BRPM | WEIGHT: 108 LBS | HEART RATE: 81 BPM | HEIGHT: 65 IN

## 2023-08-11 DIAGNOSIS — L23.7 POISON IVY: ICD-10-CM

## 2023-08-11 PROCEDURE — 3079F DIAST BP 80-89 MM HG: CPT | Performed by: INTERNAL MEDICINE

## 2023-08-11 PROCEDURE — 1036F TOBACCO NON-USER: CPT | Performed by: INTERNAL MEDICINE

## 2023-08-11 PROCEDURE — 99213 OFFICE O/P EST LOW 20 MIN: CPT | Performed by: INTERNAL MEDICINE

## 2023-08-11 PROCEDURE — 1160F RVW MEDS BY RX/DR IN RCRD: CPT | Performed by: INTERNAL MEDICINE

## 2023-08-11 PROCEDURE — 3077F SYST BP >= 140 MM HG: CPT | Performed by: INTERNAL MEDICINE

## 2023-08-11 PROCEDURE — 1126F AMNT PAIN NOTED NONE PRSNT: CPT | Performed by: INTERNAL MEDICINE

## 2023-08-11 PROCEDURE — 1159F MED LIST DOCD IN RCRD: CPT | Performed by: INTERNAL MEDICINE

## 2023-08-11 RX ORDER — TRIAMCINOLONE ACETONIDE 5 MG/G
OINTMENT TOPICAL 2 TIMES DAILY
Qty: 8 G | Refills: 0 | Status: SHIPPED | OUTPATIENT
Start: 2023-08-11 | End: 2023-08-25

## 2023-08-11 ASSESSMENT — PAIN SCALES - GENERAL: PAINLEVEL: 0-NO PAIN

## 2023-08-11 NOTE — PROGRESS NOTES
Patient ID:   Stacy Galicia is a 78 y.o. female with PMH remarkable for depression, diverticulosis, hypothyroidism who presents to the office today for Rash (Rash on arms and legs, was working outside on Saturday ).    Poison Ivy  This is a new problem. The current episode started yesterday. The problem has been gradually worsening since onset. The affected locations include the left lower leg, right lower leg, left arm and right arm. The rash is characterized by itchiness, redness and swelling. She was exposed to plant contact. Past treatments include anti-itch cream. The treatment provided no relief.     REVIEW OF SYSTEMS:  Review of Systems   Skin:  Positive for rash.     12 point review of systems negative unless stated above in HPI    VITAL SIGNS:  Vitals:    08/11/23 1112   BP: 159/82   Pulse: 81   Resp: 18   SpO2: 96%       ALLERGIES:  Allergies   Allergen Reactions    Bee Venom Protein (Honey Bee) Unknown    Sulfa (Sulfonamide Antibiotics) Unknown and Hives    Sulfamethoxazole-Trimethoprim Unknown and Hives        PHYSICAL EXAM:  Physical Exam  Vitals reviewed.   Constitutional:       General: She is not in acute distress.     Appearance: Normal appearance. She is not ill-appearing.   HENT:      Head: Normocephalic and atraumatic.      Right Ear: Tympanic membrane and external ear normal.      Left Ear: Tympanic membrane and external ear normal.      Nose: Nose normal.      Mouth/Throat:      Mouth: Mucous membranes are moist.      Pharynx: Oropharynx is clear.   Eyes:      Conjunctiva/sclera: Conjunctivae normal.      Pupils: Pupils are equal, round, and reactive to light.   Cardiovascular:      Rate and Rhythm: Normal rate and regular rhythm.      Heart sounds: Normal heart sounds. No murmur heard.  Pulmonary:      Effort: Pulmonary effort is normal. No respiratory distress.      Breath sounds: Normal breath sounds. No wheezing.   Abdominal:      General: There is no distension.      Palpations: Abdomen is  soft. There is no mass.      Tenderness: There is no abdominal tenderness.   Musculoskeletal:         General: Normal range of motion.      Cervical back: Normal range of motion and neck supple.   Skin:     General: Skin is warm and dry.      Findings: Rash present.      Comments: Bilateral arms and BLE with poison ivy rash present   Neurological:      General: No focal deficit present.      Mental Status: She is alert and oriented to person, place, and time.      Sensory: No sensory deficit.      Motor: No weakness.      Coordination: Coordination normal.      Gait: Gait normal.   Psychiatric:         Mood and Affect: Mood normal.         Behavior: Behavior normal.     MEDICATIONS:  Current Outpatient Medications on File Prior to Visit   Medication Sig Dispense Refill    citalopram (CeleXA) 10 mg tablet Take 1 tablet (10 mg) by mouth once daily.      levothyroxine (Synthroid, Levoxyl) 175 mcg tablet Take 1 tablet (175 mcg) by mouth once daily in the morning. Take before meals. 90 tablet 1    lisinopril 5 mg tablet Take 1 tablet (5 mg) by mouth once daily. 30 tablet 1    potassium chloride CR (Klor-Con) 10 mEq ER tablet Take 1 tablet (10 mEq) by mouth once daily.      traZODone (Desyrel) 50 mg tablet Take 1 tablet (50 mg) by mouth once daily at bedtime. 90 tablet 1     No current facility-administered medications on file prior to visit.        Your medication list            Accurate as of August 11, 2023 11:40 AM. If you have any questions, ask your nurse or doctor.                START taking these medications        Instructions Last Dose Given Next Dose Due   triamcinolone 0.5 % ointment  Commonly known as: Kenalog  Started by: Nick Costa MD      Apply topically 2 times a day for 14 days.              CONTINUE taking these medications        Instructions Last Dose Given Next Dose Due   citalopram 10 mg tablet  Commonly known as: CeleXA           levothyroxine 175 mcg tablet  Commonly known as: Synthroid,  Levoxyl      Take 1 tablet (175 mcg) by mouth once daily in the morning. Take before meals.       lisinopril 5 mg tablet      Take 1 tablet (5 mg) by mouth once daily.       potassium chloride CR 10 mEq ER tablet  Commonly known as: Klor-Con           traZODone 50 mg tablet  Commonly known as: Desyrel      Take 1 tablet (50 mg) by mouth once daily at bedtime.                 Where to Get Your Medications        These medications were sent to [id number] - [place], [place] - [address]  [address] [address]      Phone: [phone]   triamcinolone 0.5 % ointment       RECENT LABS:  Lab Results   Component Value Date    WBC 12.5 (H) 06/26/2023    HGB 14.9 06/26/2023    HCT 44.2 06/26/2023     06/26/2023    CHOL 178 10/18/2022    TRIG 85 10/18/2022    HDL 70.0 10/18/2022    ALT 28 06/26/2023    AST 42 (H) 06/26/2023     (L) 06/26/2023    K 4.3 06/26/2023     06/26/2023    CREATININE 0.96 06/26/2023    BUN 16 06/26/2023    CO2 22 06/26/2023    TSH 7.42 (H) 04/10/2023     ASSESSMENT AND PLAN:  Assessment/Plan   Diagnoses and all orders for this visit:  Poison ivy  -     triamcinolone (Kenalog) 0.5 % ointment; Apply topically 2 times a day for 14 days.  - call if no improvement and we can try steroids    ------  Written by Cristy Kowalski RN, acting as a scribe for Dr. Fisher. This note accurately reflects the work and decisions made by Dr. Fisher.     I, Dr. Fisher, attest all medical record entries made by the scribe were under my direction and were personally dictated by me. I have reviewed the chart and agree that the record accurately reflects my performance of the history, physical exam, and assessment and plan.

## 2023-10-02 ENCOUNTER — TELEPHONE (OUTPATIENT)
Dept: PRIMARY CARE | Facility: CLINIC | Age: 78
End: 2023-10-02
Payer: MEDICARE

## 2023-10-02 DIAGNOSIS — J32.9 SINUSITIS, UNSPECIFIED CHRONICITY, UNSPECIFIED LOCATION: Primary | ICD-10-CM

## 2023-10-02 RX ORDER — GUAIFENESIN 600 MG/1
600 TABLET, EXTENDED RELEASE ORAL 2 TIMES DAILY
Qty: 10 TABLET | Refills: 0 | Status: SHIPPED | OUTPATIENT
Start: 2023-10-02 | End: 2023-10-07

## 2023-10-02 RX ORDER — DOXYCYCLINE 100 MG/1
100 CAPSULE ORAL 2 TIMES DAILY
Qty: 14 CAPSULE | Refills: 0 | Status: SHIPPED | OUTPATIENT
Start: 2023-10-02 | End: 2023-10-09

## 2023-10-02 RX ORDER — FLUTICASONE PROPIONATE 50 MCG
1 SPRAY, SUSPENSION (ML) NASAL DAILY
Qty: 16 G | Refills: 0 | Status: SHIPPED | OUTPATIENT
Start: 2023-10-02 | End: 2024-10-01

## 2023-11-28 DIAGNOSIS — F32.4 MAJOR DEPRESSIVE DISORDER WITH SINGLE EPISODE, IN PARTIAL REMISSION (CMS-HCC): ICD-10-CM

## 2023-11-28 RX ORDER — TRAZODONE HYDROCHLORIDE 50 MG/1
50 TABLET ORAL NIGHTLY
Qty: 90 TABLET | Refills: 1 | Status: SHIPPED | OUTPATIENT
Start: 2023-11-28 | End: 2024-05-16 | Stop reason: SDUPTHER

## 2024-01-24 ENCOUNTER — OFFICE VISIT (OUTPATIENT)
Dept: PRIMARY CARE | Facility: CLINIC | Age: 79
End: 2024-01-24
Payer: MEDICARE

## 2024-01-24 VITALS
RESPIRATION RATE: 18 BRPM | BODY MASS INDEX: 17.83 KG/M2 | WEIGHT: 107 LBS | DIASTOLIC BLOOD PRESSURE: 82 MMHG | HEART RATE: 86 BPM | OXYGEN SATURATION: 98 % | HEIGHT: 65 IN | SYSTOLIC BLOOD PRESSURE: 136 MMHG

## 2024-01-24 DIAGNOSIS — F41.9 ANXIETY: Primary | ICD-10-CM

## 2024-01-24 DIAGNOSIS — H92.02 LEFT EAR PAIN: ICD-10-CM

## 2024-01-24 DIAGNOSIS — F51.01 PRIMARY INSOMNIA: ICD-10-CM

## 2024-01-24 DIAGNOSIS — F33.0 MILD EPISODE OF RECURRENT MAJOR DEPRESSIVE DISORDER (CMS-HCC): ICD-10-CM

## 2024-01-24 PROBLEM — M25.561 RIGHT KNEE PAIN: Status: RESOLVED | Noted: 2023-02-24 | Resolved: 2024-01-24
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
## 2024-01-24 PROBLEM — R55 NEAR SYNCOPE: Status: RESOLVED | Noted: 2024-01-24 | Resolved: 2024-01-24

## 2024-01-24 PROBLEM — L03.90 CELLULITIS: Status: RESOLVED | Noted: 2023-02-24 | Resolved: 2024-01-24

## 2024-01-24 PROBLEM — R25.2 LEG CRAMPING: Status: RESOLVED | Noted: 2023-02-24 | Resolved: 2024-01-24

## 2024-01-24 PROBLEM — M79.605 BILATERAL LEG AND FOOT PAIN: Status: RESOLVED | Noted: 2023-02-24 | Resolved: 2024-01-24

## 2024-01-24 PROBLEM — M79.671 BILATERAL LEG AND FOOT PAIN: Status: RESOLVED | Noted: 2023-02-24 | Resolved: 2024-01-24

## 2024-01-24 PROBLEM — S39.012A LUMBAR STRAIN: Status: RESOLVED | Noted: 2023-02-24 | Resolved: 2024-01-24
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
## 2024-01-24 PROBLEM — R42 LIGHTHEADEDNESS: Status: RESOLVED | Noted: 2024-01-24 | Resolved: 2024-01-24

## 2024-01-24 PROBLEM — M25.551 RIGHT HIP PAIN: Status: RESOLVED | Noted: 2023-02-24 | Resolved: 2024-01-24

## 2024-01-24 PROBLEM — B35.1 MYCOTIC TOENAILS: Status: RESOLVED | Noted: 2023-02-24 | Resolved: 2024-01-24

## 2024-01-24 PROBLEM — M79.604 BILATERAL LEG AND FOOT PAIN: Status: RESOLVED | Noted: 2023-02-24 | Resolved: 2024-01-24

## 2024-01-24 PROBLEM — B37.2 CANDIDIASIS, INTERTRIGO: Status: RESOLVED | Noted: 2023-02-24 | Resolved: 2024-01-24

## 2024-01-24 PROBLEM — R51.9 HEADACHE: Status: RESOLVED | Noted: 2023-02-24 | Resolved: 2024-01-24

## 2024-01-24 PROBLEM — M79.672 BILATERAL LEG AND FOOT PAIN: Status: RESOLVED | Noted: 2023-02-24 | Resolved: 2024-01-24

## 2024-01-24 PROBLEM — K57.92 DIVERTICULITIS: Status: RESOLVED | Noted: 2023-02-24 | Resolved: 2024-01-24

## 2024-01-24 PROBLEM — R29.6 RECURRENT FALLS: Status: RESOLVED | Noted: 2023-02-24 | Resolved: 2024-01-24

## 2024-01-24 PROBLEM — M54.9 BACK PAIN: Status: RESOLVED | Noted: 2023-02-24 | Resolved: 2024-01-24

## 2024-01-24 PROBLEM — M53.3 PAIN OF RIGHT SACROILIAC JOINT: Status: RESOLVED | Noted: 2023-02-24 | Resolved: 2024-01-24

## 2024-01-24 PROBLEM — S22.080A T12 COMPRESSION FRACTURE, INITIAL ENCOUNTER (MULTI): Status: RESOLVED | Noted: 2023-02-24 | Resolved: 2024-01-24

## 2024-01-24 PROBLEM — I10 PRIMARY HYPERTENSION: Status: RESOLVED | Noted: 2023-02-24 | Resolved: 2024-01-24

## 2024-01-24 PROBLEM — R03.0 ELEVATED BLOOD PRESSURE READING: Status: RESOLVED | Noted: 2023-04-06 | Resolved: 2024-01-24

## 2024-01-24 PROCEDURE — 1036F TOBACCO NON-USER: CPT

## 2024-01-24 PROCEDURE — 1159F MED LIST DOCD IN RCRD: CPT

## 2024-01-24 PROCEDURE — 1160F RVW MEDS BY RX/DR IN RCRD: CPT

## 2024-01-24 PROCEDURE — 1125F AMNT PAIN NOTED PAIN PRSNT: CPT

## 2024-01-24 PROCEDURE — 99213 OFFICE O/P EST LOW 20 MIN: CPT

## 2024-01-24 ASSESSMENT — ENCOUNTER SYMPTOMS
VOMITING: 0
FATIGUE: 0
NERVOUS/ANXIOUS: 1
FEVER: 0
CHILLS: 0
SHORTNESS OF BREATH: 0
NAUSEA: 0
DIARRHEA: 0

## 2024-01-24 ASSESSMENT — PAIN SCALES - GENERAL: PAINLEVEL: 2

## 2024-01-24 NOTE — PROGRESS NOTES
"Subjective   Patient ID: Stacy Galicia is a 78 y.o. female who presents for Follow-up and Earache (Left ).    HPI   PMH remarkable for depression, diverticulosis, hypothyroidism, and HTN who presents to the office today for follow up and left ear pain for 1 week. Patient states 2-3 weeks ago she had her left ear washed out and is having some pain. States she has increased anxiety with her \"heartache\" being recently sent to nursing home for 6 months. Would like to have someone to talk to. No other current complaints.     Review of Systems   Constitutional:  Negative for chills, fatigue and fever.   Respiratory:  Negative for shortness of breath.    Cardiovascular:  Negative for chest pain.   Gastrointestinal:  Negative for diarrhea, nausea and vomiting.   Psychiatric/Behavioral:  The patient is nervous/anxious.    All other systems reviewed and are negative.      Objective   /82   Pulse 86   Resp 18   Ht 1.651 m (5' 5\")   Wt 48.5 kg (107 lb)   SpO2 98%   BMI 17.81 kg/m²     Physical Exam  Vitals reviewed.   Constitutional:       Appearance: Normal appearance.   HENT:      Head: Normocephalic and atraumatic.      Right Ear: External ear normal. There is impacted cerumen.      Left Ear: External ear normal. There is impacted cerumen.   Eyes:      Extraocular Movements: Extraocular movements intact.      Conjunctiva/sclera: Conjunctivae normal.      Pupils: Pupils are equal, round, and reactive to light.   Cardiovascular:      Rate and Rhythm: Normal rate and regular rhythm.      Pulses: Normal pulses.      Heart sounds: Normal heart sounds.   Pulmonary:      Effort: Pulmonary effort is normal.      Breath sounds: Normal breath sounds.   Musculoskeletal:         General: Normal range of motion.   Neurological:      General: No focal deficit present.      Mental Status: She is alert and oriented to person, place, and time.   Psychiatric:         Mood and Affect: Mood is anxious.         Behavior: Behavior is " hyperactive. Behavior is cooperative.         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 Would like someone to talk to. Referred to psychiatry for further management.  - Ears impacted with cerumen bilaterally. Recommended cotton ball with oil to help soften and remove the wax.  - All other conditions stable.  - Follow up in 4 months.

## 2024-02-14 ENCOUNTER — APPOINTMENT (OUTPATIENT)
Dept: PODIATRY | Facility: CLINIC | Age: 79
End: 2024-02-14
Payer: MEDICARE

## 2024-03-21 ENCOUNTER — OFFICE VISIT (OUTPATIENT)
Dept: PRIMARY CARE | Facility: CLINIC | Age: 79
End: 2024-03-21
Payer: MEDICARE

## 2024-03-21 ENCOUNTER — LAB (OUTPATIENT)
Dept: LAB | Facility: LAB | Age: 79
End: 2024-03-21
Payer: MEDICARE

## 2024-03-21 VITALS
HEART RATE: 80 BPM | RESPIRATION RATE: 18 BRPM | BODY MASS INDEX: 18.69 KG/M2 | HEIGHT: 65 IN | SYSTOLIC BLOOD PRESSURE: 126 MMHG | WEIGHT: 112.2 LBS | OXYGEN SATURATION: 94 % | DIASTOLIC BLOOD PRESSURE: 72 MMHG

## 2024-03-21 DIAGNOSIS — M79.89 REDNESS AND SWELLING OF HAND: ICD-10-CM

## 2024-03-21 DIAGNOSIS — M79.89 REDNESS AND SWELLING OF HAND: Primary | ICD-10-CM

## 2024-03-21 DIAGNOSIS — R23.8 REDNESS AND SWELLING OF HAND: Primary | ICD-10-CM

## 2024-03-21 DIAGNOSIS — R23.8 REDNESS AND SWELLING OF HAND: ICD-10-CM

## 2024-03-21 LAB
BASOPHILS # BLD AUTO: 0.09 X10*3/UL (ref 0–0.1)
BASOPHILS NFR BLD AUTO: 1.5 %
EOSINOPHIL # BLD AUTO: 0.11 X10*3/UL (ref 0–0.4)
EOSINOPHIL NFR BLD AUTO: 1.8 %
ERYTHROCYTE [DISTWIDTH] IN BLOOD BY AUTOMATED COUNT: 12.5 % (ref 11.5–14.5)
ERYTHROCYTE [SEDIMENTATION RATE] IN BLOOD BY WESTERGREN METHOD: 3 MM/H (ref 0–30)
HCT VFR BLD AUTO: 40.8 % (ref 36–46)
HGB BLD-MCNC: 13.1 G/DL (ref 12–16)
IMM GRANULOCYTES # BLD AUTO: 0.02 X10*3/UL (ref 0–0.5)
IMM GRANULOCYTES NFR BLD AUTO: 0.3 % (ref 0–0.9)
LYMPHOCYTES # BLD AUTO: 1.88 X10*3/UL (ref 0.8–3)
LYMPHOCYTES NFR BLD AUTO: 31.4 %
MCH RBC QN AUTO: 29.8 PG (ref 26–34)
MCHC RBC AUTO-ENTMCNC: 32.1 G/DL (ref 32–36)
MCV RBC AUTO: 93 FL (ref 80–100)
MONOCYTES # BLD AUTO: 0.61 X10*3/UL (ref 0.05–0.8)
MONOCYTES NFR BLD AUTO: 10.2 %
NEUTROPHILS # BLD AUTO: 3.27 X10*3/UL (ref 1.6–5.5)
NEUTROPHILS NFR BLD AUTO: 54.8 %
NRBC BLD-RTO: 0 /100 WBCS (ref 0–0)
PLATELET # BLD AUTO: 368 X10*3/UL (ref 150–450)
RBC # BLD AUTO: 4.4 X10*6/UL (ref 4–5.2)
WBC # BLD AUTO: 6 X10*3/UL (ref 4.4–11.3)

## 2024-03-21 PROCEDURE — 1036F TOBACCO NON-USER: CPT

## 2024-03-21 PROCEDURE — 99214 OFFICE O/P EST MOD 30 MIN: CPT

## 2024-03-21 PROCEDURE — 1160F RVW MEDS BY RX/DR IN RCRD: CPT

## 2024-03-21 PROCEDURE — 36415 COLL VENOUS BLD VENIPUNCTURE: CPT

## 2024-03-21 PROCEDURE — 86038 ANTINUCLEAR ANTIBODIES: CPT

## 2024-03-21 PROCEDURE — 1125F AMNT PAIN NOTED PAIN PRSNT: CPT

## 2024-03-21 PROCEDURE — 86200 CCP ANTIBODY: CPT

## 2024-03-21 PROCEDURE — 1159F MED LIST DOCD IN RCRD: CPT

## 2024-03-21 RX ORDER — PREDNISONE 5 MG/1
5 TABLET ORAL DAILY
Qty: 90 TABLET | Refills: 0 | Status: SHIPPED | OUTPATIENT
Start: 2024-03-21 | End: 2024-06-19

## 2024-03-21 ASSESSMENT — PAIN SCALES - GENERAL: PAINLEVEL: 6

## 2024-03-21 NOTE — PROGRESS NOTES
"Subjective   Patient ID: Stacy Galicia is a 78 y.o. female who presents for Hand Pain (Hands swollen itchy red and painful, has Hx of RA ).    HPI   78 y.o. female with PMH remarkable for depression, diverticulosis, hypothyroidism, and HTN who presents to the office today for bilateral hand swelling and redness.     Bilateral hand swelling and redness:  - States this began on Saturday (3/16/24)- hands swelled up at the joints and became painful. States she couldn't pick anything up. Difficult toe use hands. Noticed on Tuesday (3/19/24) they became itchy. Saw urgent care yesterday and was told she has RA, was given Cetrizine Hydrochloride. Has not seen rheumatology for had autoimmune labs done. Denies any other symptoms.     Review of Systems   Constitutional:  Negative for chills, fatigue and fever.   Respiratory:  Negative for shortness of breath.    Cardiovascular:  Negative for chest pain.   Gastrointestinal:  Negative for diarrhea, nausea and vomiting.   Musculoskeletal:  Positive for arthralgias and myalgias.        Bilateral hand pain, swelling, and redness   Skin:  Positive for color change.   Neurological:  Negative for dizziness and headaches.   All other systems reviewed and are negative.      Objective   /72   Pulse 80   Resp 18   Ht 1.651 m (5' 5\")   Wt 50.9 kg (112 lb 3.2 oz)   SpO2 94%   BMI 18.67 kg/m²     Physical Exam  Vitals reviewed.   Constitutional:       Appearance: Normal appearance.   HENT:      Head: Normocephalic and atraumatic.   Eyes:      Extraocular Movements: Extraocular movements intact.      Conjunctiva/sclera: Conjunctivae normal.   Cardiovascular:      Rate and Rhythm: Normal rate and regular rhythm.      Pulses: Normal pulses.      Heart sounds: Normal heart sounds.   Pulmonary:      Effort: Pulmonary effort is normal.      Breath sounds: Normal breath sounds.   Musculoskeletal:      Right hand: Swelling and tenderness present.      Left hand: Swelling and tenderness " present.      Comments: Swelling, tenderness, and erythema most notable in the MCP joints bilaterally   Skin:     General: Skin is warm.      Findings: Erythema present.   Neurological:      General: No focal deficit present.      Mental Status: She is alert and oriented to person, place, and time.   Psychiatric:         Mood and Affect: Mood normal.         Behavior: Behavior normal.         Thought Content: Thought content normal.         Judgment: Judgment normal.         Assessment/Plan   Problem List Items Addressed This Visit             ICD-10-CM    Redness and swelling of hand - Primary M79.89, R23.8    Relevant Medications    predniSONE (Deltasone) 5 mg tablet    Other Relevant Orders    Referral to Rheumatology    BYRON with Reflex to [place] (Completed)    Sedimentation Rate (Completed)    Citrulline Antibody, IgG (Completed)    CBC and Auto Differential (Completed)     - This looks to be a possible RA flare. Patient has never been diagnosed with RA or autoimmune disease. Pending labs will treat with steroid and refer to rheumatology for further evaluation.   - Patient understands seeking care at ER with worsening symptoms.  - Follow up as usual.

## 2024-03-22 LAB
ANA SER QL HEP2 SUBST: NEGATIVE
CCP IGG SERPL-ACNC: <1 U/ML

## 2024-03-26 ASSESSMENT — ENCOUNTER SYMPTOMS
FATIGUE: 0
DIZZINESS: 0
ARTHRALGIAS: 1
HEADACHES: 0
FEVER: 0
DIARRHEA: 0
NAUSEA: 0
VOMITING: 0
MYALGIAS: 1
SHORTNESS OF BREATH: 0
CHILLS: 0
COLOR CHANGE: 1

## 2024-03-29 ENCOUNTER — TELEPHONE (OUTPATIENT)
Dept: PRIMARY CARE | Facility: CLINIC | Age: 79
End: 2024-03-29
Payer: MEDICARE

## 2024-04-23 ENCOUNTER — TELEPHONE (OUTPATIENT)
Dept: PRIMARY CARE | Facility: CLINIC | Age: 79
End: 2024-04-23
Payer: MEDICARE

## 2024-04-23 DIAGNOSIS — J34.89 SINUS DRAINAGE: Primary | ICD-10-CM

## 2024-04-23 RX ORDER — CEPHALEXIN 500 MG/1
500 CAPSULE ORAL 2 TIMES DAILY
Qty: 14 CAPSULE | Refills: 0 | Status: SHIPPED | OUTPATIENT
Start: 2024-04-23 | End: 2024-04-30

## 2024-04-23 NOTE — TELEPHONE ENCOUNTER
C/o sinus drainage -dark yellow x 1 week     Allergy: sulfa,bee venom, sulfamethoxazole- trimethoprim

## 2024-05-16 ENCOUNTER — OFFICE VISIT (OUTPATIENT)
Dept: PRIMARY CARE | Facility: CLINIC | Age: 79
End: 2024-05-16
Payer: MEDICARE

## 2024-05-16 VITALS
RESPIRATION RATE: 18 BRPM | HEIGHT: 65 IN | BODY MASS INDEX: 18.29 KG/M2 | WEIGHT: 109.8 LBS | DIASTOLIC BLOOD PRESSURE: 74 MMHG | HEART RATE: 93 BPM | OXYGEN SATURATION: 95 % | SYSTOLIC BLOOD PRESSURE: 118 MMHG

## 2024-05-16 DIAGNOSIS — Z00.00 HEALTHCARE MAINTENANCE: Primary | ICD-10-CM

## 2024-05-16 DIAGNOSIS — R79.89 OTHER SPECIFIED ABNORMAL FINDINGS OF BLOOD CHEMISTRY: ICD-10-CM

## 2024-05-16 DIAGNOSIS — F51.01 PRIMARY INSOMNIA: ICD-10-CM

## 2024-05-16 DIAGNOSIS — E43 UNSPECIFIED SEVERE PROTEIN-CALORIE MALNUTRITION (MULTI): ICD-10-CM

## 2024-05-16 DIAGNOSIS — F41.9 ANXIETY: ICD-10-CM

## 2024-05-16 DIAGNOSIS — F33.0 MILD EPISODE OF RECURRENT MAJOR DEPRESSIVE DISORDER (CMS-HCC): ICD-10-CM

## 2024-05-16 PROBLEM — J20.9 ACUTE BRONCHITIS: Status: ACTIVE | Noted: 2024-05-16

## 2024-05-16 PROBLEM — Z86.19 HISTORY OF HERPES ZOSTER: Status: ACTIVE | Noted: 2024-05-16

## 2024-05-16 PROBLEM — R22.0 SWELLING OF FACE: Status: ACTIVE | Noted: 2024-05-16

## 2024-05-16 PROBLEM — M77.10 LATERAL EPICONDYLITIS: Status: ACTIVE | Noted: 2024-05-16

## 2024-05-16 PROBLEM — M62.830 SPASM OF MUSCLE OF LOWER BACK: Status: ACTIVE | Noted: 2024-05-16

## 2024-05-16 PROBLEM — R53.83 FATIGUE: Status: ACTIVE | Noted: 2024-05-16

## 2024-05-16 PROBLEM — R68.83 CHILLS: Status: ACTIVE | Noted: 2024-05-16

## 2024-05-16 PROBLEM — R05.9 COUGH: Status: ACTIVE | Noted: 2024-05-16

## 2024-05-16 PROBLEM — L98.9 INFLAMMATORY DERMATOSIS: Status: ACTIVE | Noted: 2024-05-16

## 2024-05-16 PROBLEM — B86 INFESTATION BY SARCOPTES SCABIEI: Status: ACTIVE | Noted: 2024-05-16

## 2024-05-16 PROBLEM — S22.000A COMPRESSION FRACTURE OF THORACIC VERTEBRA (MULTI): Status: ACTIVE | Noted: 2023-02-24

## 2024-05-16 PROCEDURE — 99214 OFFICE O/P EST MOD 30 MIN: CPT

## 2024-05-16 PROCEDURE — 1159F MED LIST DOCD IN RCRD: CPT

## 2024-05-16 PROCEDURE — 1036F TOBACCO NON-USER: CPT

## 2024-05-16 PROCEDURE — 1126F AMNT PAIN NOTED NONE PRSNT: CPT

## 2024-05-16 PROCEDURE — 1160F RVW MEDS BY RX/DR IN RCRD: CPT

## 2024-05-16 RX ORDER — TRAZODONE HYDROCHLORIDE 50 MG/1
50 TABLET ORAL NIGHTLY
Qty: 90 TABLET | Refills: 1 | Status: SHIPPED | OUTPATIENT
Start: 2024-05-16 | End: 2024-11-12

## 2024-05-16 ASSESSMENT — ENCOUNTER SYMPTOMS
NAUSEA: 0
FEVER: 0
NERVOUS/ANXIOUS: 1
HEADACHES: 0
DIARRHEA: 0
FATIGUE: 0
DYSPHORIC MOOD: 1
CHILLS: 0
VOMITING: 0
SHORTNESS OF BREATH: 0
DIZZINESS: 0

## 2024-05-16 ASSESSMENT — PATIENT HEALTH QUESTIONNAIRE - PHQ9
2. FEELING DOWN, DEPRESSED OR HOPELESS: NOT AT ALL
SUM OF ALL RESPONSES TO PHQ9 QUESTIONS 1 AND 2: 0
1. LITTLE INTEREST OR PLEASURE IN DOING THINGS: NOT AT ALL

## 2024-05-16 ASSESSMENT — PAIN SCALES - GENERAL: PAINLEVEL: 0-NO PAIN

## 2024-05-16 NOTE — PROGRESS NOTES
"Subjective   Patient ID: Stacy Galicia is a 78 y.o. female who presents for Follow-up (4 mth/Nervous, anxious, increased stressed).    HPI   78 y.o. female with PMH remarkable for depression, diverticulosis, hypothyroidism, and HTN who presents to the office today for 4 month follow up. C/o increased stress and difficulty sleeping. Patient reports she does have friends that are former coworkers and neighbors that she can get in touch with for support. Patient has had repeated difficulty with dealing with issues revolving her 50 y.o. daughter. Patient is very active in the community and helps at the [employer] and volunteers at the hospital. States she has an upcoming trip to [place] and [place] [facility] [place] Denies any other complaints today.     Review of Systems   Constitutional:  Negative for chills, fatigue and fever.   Respiratory:  Negative for shortness of breath.    Cardiovascular:  Negative for chest pain.   Gastrointestinal:  Negative for diarrhea, nausea and vomiting.   Neurological:  Negative for dizziness and headaches.   Psychiatric/Behavioral:  Positive for dysphoric mood. The patient is nervous/anxious.    All other systems reviewed and are negative.      Objective   /74   Pulse 93   Resp 18   Ht 1.651 m (5' 5\")   Wt 49.8 kg (109 lb 12.8 oz)   SpO2 95%   BMI 18.27 kg/m²     Physical Exam  Constitutional:       Appearance: Normal appearance.   HENT:      Head: Normocephalic and atraumatic.   Eyes:      Extraocular Movements: Extraocular movements intact.      Conjunctiva/sclera: Conjunctivae normal.   Neck:      Vascular: No carotid bruit.   Cardiovascular:      Rate and Rhythm: Normal rate and regular rhythm.   Pulmonary:      Effort: Pulmonary effort is normal.      Breath sounds: Normal breath sounds. No wheezing, rhonchi or rales.   Musculoskeletal:      Cervical back: Normal range of motion and neck supple.   Neurological:      General: No focal deficit present.      " Mental Status: She is alert and oriented to person, place, and time. Mental status is at baseline.   Psychiatric:         Mood and Affect: Mood is anxious and depressed.         Behavior: Behavior normal. Behavior is cooperative.         Thought Content: Thought content normal. Thought content does not include homicidal or suicidal ideation.         Judgment: Judgment normal.         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    Relevant Orders    Referral to Psychiatry    Anxiety F41.9    Relevant Orders    Referral to Psychiatry     Other Visit Diagnoses         Codes    Healthcare maintenance    -  Primary Z00.00    Relevant Orders    Hemoglobin A1C    CBC and Auto Differential    Lipid Panel    Comprehensive Metabolic Panel    Vitamin D 25-Hydroxy,Total (for eval of Vitamin D levels)    TSH    T4, free    Other specified abnormal findings of blood chemistry     R79.89    Relevant Orders    CBC and Auto Differential    Unspecified severe protein-calorie malnutrition (Multi)     E43    Relevant Orders    Vitamin D 25-Hydroxy,Total (for eval of Vitamin D levels)          - Recommend patient to follow up with psych for additional layer of support and to help manage medications for depression and anxiety.  - Patient due for annual blood work. Order placed. Will follow up with results.   - Follow up in 4 months.

## 2024-05-22 ENCOUNTER — APPOINTMENT (OUTPATIENT)
Dept: PODIATRY | Facility: CLINIC | Age: 79
End: 2024-05-22
Payer: MEDICARE

## 2024-06-04 ENCOUNTER — OFFICE VISIT (OUTPATIENT)
Dept: PODIATRY | Facility: CLINIC | Age: 79
End: 2024-06-04
Payer: MEDICARE

## 2024-06-04 DIAGNOSIS — L85.1 ACQUIRED PLANTAR POROKERATOSIS: ICD-10-CM

## 2024-06-04 DIAGNOSIS — M79.672 FOOT PAIN, BILATERAL: Primary | ICD-10-CM

## 2024-06-04 DIAGNOSIS — M79.671 FOOT PAIN, BILATERAL: Primary | ICD-10-CM

## 2024-06-04 DIAGNOSIS — B35.1 ONYCHOMYCOSIS: ICD-10-CM

## 2024-06-04 PROCEDURE — 1160F RVW MEDS BY RX/DR IN RCRD: CPT | Performed by: PODIATRIST

## 2024-06-04 PROCEDURE — 1159F MED LIST DOCD IN RCRD: CPT | Performed by: PODIATRIST

## 2024-06-04 PROCEDURE — 1036F TOBACCO NON-USER: CPT | Performed by: PODIATRIST

## 2024-06-04 PROCEDURE — 99212 OFFICE O/P EST SF 10 MIN: CPT | Performed by: PODIATRIST

## 2024-06-04 NOTE — PROGRESS NOTES
This is a 78 y.o. female est patient for foot exam    History of Present Illness:   This 78 year old female presents to clinic today for foot concerns. Patient denies being a diabetic but is unable to safely trim her nails on own. Patient states they are tender in shoe gear. Concerned of IGTN. Denies trauma to area, no other pedal complaints at this time.       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    Acquired plantar porokeratosis    Acute frontal sinusitis, unspecified 10/14/2019    Acute frontal sinusitis    Acute maxillary sinusitis, unspecified 10/06/2017    Acute maxillary sinusitis    Acute upper respiratory infection, unspecified 10/14/2019    Acute URI    Allergic contact dermatitis due to plants, except food 07/29/2020    Contact dermatitis due to poison ivy    Bee sting 06/27/2023    Bitten by cat, initial encounter 11/09/2020    Cat bite    Bitten or stung by nonvenomous insect and other nonvenomous arthropods, initial encounter 07/23/2019    Reaction to insect bite    Bitten or stung by nonvenomous insect and other nonvenomous arthropods, initial encounter 07/23/2019    Insect bite of multiple sites with local reaction    Cat bite 01/24/2024    Cramp and spasm 08/26/2016    Cramp of both lower extremities    Encounter for immunization 08/31/2020    Influenza vaccination administered at current visit    Impacted cerumen, bilateral 09/22/2015    Impacted cerumen of both ears    Insect bite (nonvenomous) of left upper arm, initial encounter (CODE) 08/04/2014    Insect bite of arm, left    Leg cramping 02/24/2023    Lightheadedness 01/24/2024    Lumbago with sciatica, right side 05/30/2018    Acute right-sided low back pain with right-sided sciatica    Muscle spasm of back 05/15/2018    Spasm of lumbar paraspinous muscle    Mycotic toenails 02/24/2023    Nausea 06/27/2023    Near syncope 01/24/2024    Open bite of  left hand, initial encounter 08/07/2020    Cat bite of left hand, initial encounter    Open bite of left hand, subsequent encounter 08/31/2020    Cat bite of hand, left, subsequent encounter    Open bite of right hand, subsequent encounter 03/27/2017    Cat bite of right hand, subsequent encounter    Open bite of right wrist, initial encounter 03/17/2017    Cat bite of right wrist, initial encounter    Open bite of right wrist, initial encounter 03/17/2017    Cat bite of right wrist with infection, initial encounter    Other conditions influencing health status     Nonvenomous Insect Bite                                                                                                                                                          Other specified anxiety disorders 06/18/2021    Depression with anxiety    Pain in left hand     Hand pain, left    Pain in left hand 06/07/2017    Pain of left hand    Pain in unspecified hip 07/15/2015    Hip pain    Pelvic and perineal pain 03/10/2016    Suprapubic pain    Personal history of diseases of the skin and subcutaneous tissue 12/14/2020    History of dermatitis    Personal history of diseases of the skin and subcutaneous tissue 08/29/2018    History of dermatitis    Personal history of other (healed) physical injury and trauma     History of insect bite    Personal history of other diseases of the musculoskeletal system and connective tissue 03/25/2014    History of lateral epicondylitis    Personal history of other diseases of the respiratory system 10/06/2017    History of acute pharyngitis    Personal history of other diseases of the respiratory system 01/29/2016    History of upper respiratory infection    Personal history of other diseases of the respiratory system 10/06/2017    History of acute bronchitis    Personal history of other infectious and parasitic diseases 06/13/2019    History of scabies    Personal history of other infectious and parasitic diseases      History of herpes zoster    Personal history of other specified conditions 08/14/2015    History of abdominal pain    Personal history of other specified conditions 06/18/2021    History of fatigue    Rash and other nonspecific skin eruption     Rash    Sciatica, left side 07/24/2015    Sciatica of left side    Scratched by cat, initial encounter 11/02/2020    Cat scratch    Sore throat 06/27/2023    Strain of unspecified muscle(s) and tendon(s) at lower leg level, right leg, initial encounter 12/09/2019    Knee strain, right, initial encounter    Syncope and collapse 02/18/2014    Syncope and collapse    T12 compression fracture, initial encounter (Multi) 02/24/2023    Toxic effect of venom of bees, accidental (unintentional), initial encounter 09/09/2020    Local reaction to bee sting, accidental or unintentional, initial encounter    Toxic effect of venom of bees, accidental (unintentional), initial encounter 09/09/2020    Bee sting, accidental or unintentional, initial encounter    Toxic effect of venom of bees, accidental (unintentional), initial encounter 09/09/2020    Local reaction to bee sting, accidental or unintentional, initial encounter    Unspecified contact dermatitis due to plants, except food 06/14/2016    Contact dermatitis due to plant       Medications and Allergies have been reviewed.    Review Of Systems:  GENERAL: No weight loss, malaise or fevers.  HEENT: Negative for frequent or significant headaches,   RESPIRATORY: Negative for cough, wheezing or shortness of breath.  CARDIOVASCULAR: Negative for chest pain, leg swelling or palpitations.    Physical Exam:  Patient is a pleasant, cooperative, well developed 78 y.o.  adult female. The patient is alert and oriented to time, place and person.   Patient has normal affect and mood.    Examination of Both Lower Extremities:   Vascular exam: Dorsalis pedis and Posterior Tibial pulses palpable 1/4 bilateral. Capillary refill time less than 3  seconds b/l. Hair growth noted to digits. No edema noted. Skin temp warm to warm from proximal to distal b/l. ++ varicosities noted.     Neurologic exam: Vibratory, protective and proprioception intact b/l. No neurologic deficits noted.      Musculoskeletal exam: Muscle strength 5/5 for all major muscle groups tested in the lower extremity b/l. No gross deformities noted b/l.      Dermatologic exam: Nails 1-5 b/l are thickened, elongated and discolored with the presence of subungual debris, tender to palpation. Webspaces 1-4 b/l are clean, dry and intact. HPK tissue noted to left dorsal 5th met head. Upon debridement nucleated core noted. NO pin point bleeding    1. Foot pain, bilateral        2. Onychomycosis        3. Acquired plantar porokeratosis            Patient educated on proper foot care.  Nails 1-5 b/l were debrided in thickness and length with nail cutting forceps.  A1C revd. 5.8 in June 2024  All hpk tissue was debrided to smooth skin  Patient to follow up prn  Patient was in agreement to this plan. All questions answered.      Tia Astorga DPM  [phone]  [address]  Fax: [phone]

## 2024-06-06 ENCOUNTER — LAB (OUTPATIENT)
Dept: LAB | Facility: LAB | Age: 79
End: 2024-06-06
Payer: MEDICARE

## 2024-06-06 DIAGNOSIS — R79.89 OTHER SPECIFIED ABNORMAL FINDINGS OF BLOOD CHEMISTRY: ICD-10-CM

## 2024-06-06 DIAGNOSIS — Z00.00 HEALTHCARE MAINTENANCE: ICD-10-CM

## 2024-06-06 DIAGNOSIS — E43 UNSPECIFIED SEVERE PROTEIN-CALORIE MALNUTRITION (MULTI): ICD-10-CM

## 2024-06-06 LAB
25(OH)D3 SERPL-MCNC: 37 NG/ML (ref 30–100)
ALBUMIN SERPL BCP-MCNC: 4.1 G/DL (ref 3.4–5)
ALP SERPL-CCNC: 69 U/L (ref 33–136)
ALT SERPL W P-5'-P-CCNC: 14 U/L (ref 7–45)
ANION GAP SERPL CALC-SCNC: 9 MMOL/L (ref 10–20)
AST SERPL W P-5'-P-CCNC: 20 U/L (ref 9–39)
BASOPHILS # BLD AUTO: 0.08 X10*3/UL (ref 0–0.1)
BASOPHILS NFR BLD AUTO: 1.7 %
BILIRUB SERPL-MCNC: 1.5 MG/DL (ref 0–1.2)
BUN SERPL-MCNC: 17 MG/DL (ref 6–23)
CALCIUM SERPL-MCNC: 9.1 MG/DL (ref 8.6–10.3)
CHLORIDE SERPL-SCNC: 106 MMOL/L (ref 98–107)
CHOLEST SERPL-MCNC: 168 MG/DL (ref 0–199)
CHOLESTEROL/HDL RATIO: 2.5
CO2 SERPL-SCNC: 29 MMOL/L (ref 21–32)
CREAT SERPL-MCNC: 0.88 MG/DL (ref 0.5–1.05)
EGFRCR SERPLBLD CKD-EPI 2021: 68 ML/MIN/1.73M*2
EOSINOPHIL # BLD AUTO: 0.24 X10*3/UL (ref 0–0.4)
EOSINOPHIL NFR BLD AUTO: 5.1 %
ERYTHROCYTE [DISTWIDTH] IN BLOOD BY AUTOMATED COUNT: 12.6 % (ref 11.5–14.5)
EST. AVERAGE GLUCOSE BLD GHB EST-MCNC: 120 MG/DL
GLUCOSE SERPL-MCNC: 95 MG/DL (ref 74–99)
HBA1C MFR BLD: 5.8 %
HCT VFR BLD AUTO: 43.1 % (ref 36–46)
HDLC SERPL-MCNC: 67.2 MG/DL
HGB BLD-MCNC: 14.2 G/DL (ref 12–16)
IMM GRANULOCYTES # BLD AUTO: 0.01 X10*3/UL (ref 0–0.5)
IMM GRANULOCYTES NFR BLD AUTO: 0.2 % (ref 0–0.9)
LDLC SERPL CALC-MCNC: 86 MG/DL
LYMPHOCYTES # BLD AUTO: 1.85 X10*3/UL (ref 0.8–3)
LYMPHOCYTES NFR BLD AUTO: 39.2 %
MCH RBC QN AUTO: 30.3 PG (ref 26–34)
MCHC RBC AUTO-ENTMCNC: 32.9 G/DL (ref 32–36)
MCV RBC AUTO: 92 FL (ref 80–100)
MONOCYTES # BLD AUTO: 0.68 X10*3/UL (ref 0.05–0.8)
MONOCYTES NFR BLD AUTO: 14.4 %
NEUTROPHILS # BLD AUTO: 1.86 X10*3/UL (ref 1.6–5.5)
NEUTROPHILS NFR BLD AUTO: 39.4 %
NON HDL CHOLESTEROL: 101 MG/DL (ref 0–149)
NRBC BLD-RTO: 0 /100 WBCS (ref 0–0)
PLATELET # BLD AUTO: 299 X10*3/UL (ref 150–450)
POTASSIUM SERPL-SCNC: 3.8 MMOL/L (ref 3.5–5.3)
PROT SERPL-MCNC: 6.1 G/DL (ref 6.4–8.2)
RBC # BLD AUTO: 4.68 X10*6/UL (ref 4–5.2)
SODIUM SERPL-SCNC: 140 MMOL/L (ref 136–145)
T4 FREE SERPL-MCNC: 0.89 NG/DL (ref 0.61–1.12)
TRIGL SERPL-MCNC: 72 MG/DL (ref 0–149)
TSH SERPL-ACNC: 21.8 MIU/L (ref 0.44–3.98)
VLDL: 14 MG/DL (ref 0–40)
WBC # BLD AUTO: 4.7 X10*3/UL (ref 4.4–11.3)

## 2024-06-06 PROCEDURE — 82306 VITAMIN D 25 HYDROXY: CPT

## 2024-06-06 PROCEDURE — 83036 HEMOGLOBIN GLYCOSYLATED A1C: CPT

## 2024-06-06 PROCEDURE — 36415 COLL VENOUS BLD VENIPUNCTURE: CPT

## 2024-06-13 DIAGNOSIS — E03.9 HYPOTHYROIDISM, UNSPECIFIED TYPE: Primary | ICD-10-CM

## 2024-06-13 RX ORDER — LEVOTHYROXINE SODIUM 200 UG/1
200 TABLET ORAL DAILY
Qty: 90 TABLET | Refills: 0 | Status: SHIPPED | OUTPATIENT
Start: 2024-06-13 | End: 2024-09-11

## 2024-06-17 ENCOUNTER — APPOINTMENT (OUTPATIENT)
Dept: RHEUMATOLOGY | Facility: CLINIC | Age: 79
End: 2024-06-17
Payer: MEDICARE

## 2024-07-26 DIAGNOSIS — E03.9 HYPOTHYROIDISM, UNSPECIFIED TYPE: ICD-10-CM

## 2024-09-17 ENCOUNTER — APPOINTMENT (OUTPATIENT)
Dept: PRIMARY CARE | Facility: CLINIC | Age: 79
End: 2024-09-17
Payer: MEDICARE

## 2024-09-17 VITALS
HEART RATE: 67 BPM | DIASTOLIC BLOOD PRESSURE: 88 MMHG | OXYGEN SATURATION: 100 % | HEIGHT: 65 IN | RESPIRATION RATE: 18 BRPM | SYSTOLIC BLOOD PRESSURE: 158 MMHG | BODY MASS INDEX: 17.83 KG/M2 | WEIGHT: 107 LBS

## 2024-09-17 DIAGNOSIS — Z23 FLU VACCINE NEED: ICD-10-CM

## 2024-09-17 DIAGNOSIS — H92.02 LEFT EAR PAIN: ICD-10-CM

## 2024-09-17 DIAGNOSIS — Z00.00 ENCOUNTER FOR ANNUAL WELLNESS VISIT (AWV) IN MEDICARE PATIENT: ICD-10-CM

## 2024-09-17 DIAGNOSIS — F32.A DEPRESSION, UNSPECIFIED DEPRESSION TYPE: ICD-10-CM

## 2024-09-17 DIAGNOSIS — F51.01 PRIMARY INSOMNIA: ICD-10-CM

## 2024-09-17 DIAGNOSIS — E03.9 HYPOTHYROIDISM, UNSPECIFIED TYPE: ICD-10-CM

## 2024-09-17 DIAGNOSIS — H61.23 BILATERAL IMPACTED CERUMEN: Primary | ICD-10-CM

## 2024-09-17 PROBLEM — F33.0 MILD EPISODE OF RECURRENT MAJOR DEPRESSIVE DISORDER (CMS-HCC): Status: RESOLVED | Noted: 2023-02-24 | Resolved: 2024-09-17

## 2024-09-17 PROBLEM — F32.4 MAJOR DEPRESSIVE DISORDER WITH SINGLE EPISODE, IN PARTIAL REMISSION (CMS-HCC): Status: RESOLVED | Noted: 2023-02-24 | Resolved: 2024-09-17

## 2024-09-17 PROCEDURE — 1036F TOBACCO NON-USER: CPT | Performed by: INTERNAL MEDICINE

## 2024-09-17 PROCEDURE — 99213 OFFICE O/P EST LOW 20 MIN: CPT | Performed by: INTERNAL MEDICINE

## 2024-09-17 PROCEDURE — G0008 ADMIN INFLUENZA VIRUS VAC: HCPCS | Performed by: INTERNAL MEDICINE

## 2024-09-17 PROCEDURE — 1126F AMNT PAIN NOTED NONE PRSNT: CPT | Performed by: INTERNAL MEDICINE

## 2024-09-17 PROCEDURE — 90662 IIV NO PRSV INCREASED AG IM: CPT | Performed by: INTERNAL MEDICINE

## 2024-09-17 PROCEDURE — G0439 PPPS, SUBSEQ VISIT: HCPCS | Performed by: INTERNAL MEDICINE

## 2024-09-17 PROCEDURE — 69209 REMOVE IMPACTED EAR WAX UNI: CPT | Performed by: INTERNAL MEDICINE

## 2024-09-17 PROCEDURE — 1170F FXNL STATUS ASSESSED: CPT | Performed by: INTERNAL MEDICINE

## 2024-09-17 PROCEDURE — 1160F RVW MEDS BY RX/DR IN RCRD: CPT | Performed by: INTERNAL MEDICINE

## 2024-09-17 PROCEDURE — 1159F MED LIST DOCD IN RCRD: CPT | Performed by: INTERNAL MEDICINE

## 2024-09-17 RX ORDER — IBUPROFEN 800 MG/1
800 TABLET ORAL EVERY 6 HOURS PRN
COMMUNITY
Start: 2024-08-14

## 2024-09-17 ASSESSMENT — ACTIVITIES OF DAILY LIVING (ADL)
MANAGING_FINANCES: INDEPENDENT
DRESSING: INDEPENDENT
GROCERY_SHOPPING: INDEPENDENT
BATHING: INDEPENDENT
DOING_HOUSEWORK: INDEPENDENT
TAKING_MEDICATION: INDEPENDENT

## 2024-09-17 ASSESSMENT — PAIN SCALES - GENERAL: PAINLEVEL: 0-NO PAIN

## 2024-09-17 ASSESSMENT — PATIENT HEALTH QUESTIONNAIRE - PHQ9
SUM OF ALL RESPONSES TO PHQ9 QUESTIONS 1 AND 2: 1
1. LITTLE INTEREST OR PLEASURE IN DOING THINGS: NOT AT ALL
2. FEELING DOWN, DEPRESSED OR HOPELESS: SEVERAL DAYS

## 2024-09-17 NOTE — PROGRESS NOTES
Patient ID:   Stacy Galicia is a 79 y.o. female with PMH remarkable for depression, diverticulosis, hypothyroidism who presents to the office today for Medicare Annual Wellness Visit Subsequent and Ear Fullness.    HEALTH MAINTENANCE: Annual Medicare Wellness Physical  Last Office Visit: 1/24/2024 for FU and earache on left.   Mammogram (40-75): 6/27/2023 -ve  Last Labs: 6/6/2024  DEXA (65+, q 2 years): 9/2020 showing osteopenia of left femoral neck and left total hip.  Echo 10/29/2020 showed LVSF 65-69%, mild pulm HTN    TSH 21.80, Free T4 0.89 on 6/6/2024 --> DUE    Social History     Tobacco Use    Smoking status: Never     Passive exposure: Never    Smokeless tobacco: Never   Substance Use Topics    Alcohol use: Yes     Comment: occasional    Drug use: Never       Immunization History   Administered Date(s) Administered    Flu vaccine, quadrivalent, high-dose, preservative free, age 65y+ (FLUZONE) 11/03/2021, 10/30/2023    Flu vaccine, trivalent, preservative free, HIGH-DOSE, age 65y+ (Fluzone) 09/14/2017, 09/27/2018, 09/18/2019, 09/17/2024    Influenza Nasal, Unspecified 10/23/2006, 11/10/2007, 10/01/2009, 10/05/2010    Influenza, Seasonal, Quadrivalent, Adjuvanted 09/18/2022    Influenza, Unspecified 10/01/2013, 09/18/2014, 09/08/2019, 08/31/2020    Influenza, injectable, quadrivalent 09/08/2019, 08/31/2020    Influenza, seasonal, injectable 09/30/2013, 09/18/2014, 09/22/2015, 09/23/2016    Moderna SARS-CoV-2 Vaccination 01/07/2021, 02/04/2021, 11/18/2021, 07/18/2022    Novel Influenza-H1N1-09, all formulations 01/01/2010    Pneumococcal conjugate vaccine, 13-valent (PREVNAR 13) 08/19/2019    Pneumococcal polysaccharide vaccine, 23-valent, age 2 years and older (PNEUMOVAX 23) 10/01/2013    Pneumococcal, Unspecified 07/29/2010    Td (adult), unspecified 04/13/2009    Tetanus Toxoid, Unspecified 04/13/2009    Zoster vaccine, recombinant, adult (SHINGRIX) 08/31/2018, 05/16/2019    Zoster, Unspecified 05/16/2019  "   Zoster, live 10/01/2013, 10/24/2013       Review of Systems   HENT:  Positive for ear pain.    All other systems reviewed and are negative.      Allergies   Allergen Reactions    Bee Venom Protein (Honey Bee) Unknown    Sulfa (Sulfonamide Antibiotics) Unknown and Hives    Sulfamethoxazole-Trimethoprim Unknown and Hives       Visit Vitals  /88 (BP Location: Left arm, Patient Position: Sitting)   Pulse 67   Resp 18   Ht 1.651 m (5' 5\")   Wt 48.5 kg (107 lb)   SpO2 100%   BMI 17.81 kg/m²   Smoking Status Never   BSA 1.49 m²       Physical Exam  Vitals reviewed.   Constitutional:       General: She is not in acute distress.     Appearance: Normal appearance. She is not ill-appearing.   HENT:      Head: Normocephalic and atraumatic.      Right Ear: Tympanic membrane and external ear normal. There is impacted cerumen.      Left Ear: Tympanic membrane and external ear normal. There is impacted cerumen.      Nose: Nose normal.      Mouth/Throat:      Mouth: Mucous membranes are moist.      Pharynx: Oropharynx is clear.   Eyes:      Conjunctiva/sclera: Conjunctivae normal.      Pupils: Pupils are equal, round, and reactive to light.   Cardiovascular:      Rate and Rhythm: Normal rate and regular rhythm.      Heart sounds: Normal heart sounds. No murmur heard.  Pulmonary:      Effort: Pulmonary effort is normal. No respiratory distress.      Breath sounds: Normal breath sounds. No wheezing.   Abdominal:      General: There is no distension.      Palpations: Abdomen is soft. There is no mass.      Tenderness: There is no abdominal tenderness.   Musculoskeletal:         General: Normal range of motion.      Cervical back: Normal range of motion and neck supple.   Skin:     General: Skin is warm and dry.      Findings: Rash present.      Comments: Bilateral arms and BLE with poison ivy rash present   Neurological:      General: No focal deficit present.      Mental Status: She is alert and oriented to person, place, and " time.      Sensory: No sensory deficit.      Motor: No weakness.      Coordination: Coordination normal.      Gait: Gait normal.   Psychiatric:         Mood and Affect: Mood normal.         Behavior: Behavior normal.       Current Outpatient Medications   Medication Instructions    citalopram (CeleXA) 10 mg tablet 1 tablet, oral, Daily    fluticasone (Flonase) 50 mcg/actuation nasal spray 1 spray, Each Nostril, Daily, Shake gently. Before first use, prime pump. After use, clean tip and replace cap.    ibuprofen 800 mg, oral, Every 6 hours PRN    levothyroxine (SYNTHROID) 200 mcg, oral, Daily, Take on an empty stomach at the same time each day, either 30 to 60 minutes prior to breakfast    traZODone (DESYREL) 50 mg, oral, Nightly     Lab Results   Component Value Date    WBC 4.7 06/06/2024    HGB 14.2 06/06/2024    HCT 43.1 06/06/2024     06/06/2024    CHOL 168 06/06/2024    TRIG 72 06/06/2024    HDL 67.2 06/06/2024    ALT 14 06/06/2024    AST 20 06/06/2024     06/06/2024    K 3.8 06/06/2024     06/06/2024    CREATININE 0.88 06/06/2024    BUN 17 06/06/2024    CO2 29 06/06/2024    TSH 21.80 (H) 06/06/2024    INR 1.0 10/28/2020    HGBA1C 5.8 (H) 06/06/2024       Problem List Items Addressed This Visit             ICD-10-CM    Depression F32.A     - c/w celexa         Hypothyroidism E03.9     - c/w synthroid  - reminded pt about needing to recheck the TSH and free T4         Insomnia G47.00     - c/w trazodone         Left ear pain H92.02     - with cerumen impaction  - will flush ears today         Encounter for annual wellness visit (AWV) in Medicare patient Z00.00     Other Visit Diagnoses         Codes    Flu vaccine need     Z23    Relevant Orders    Flu vaccine, trivalent, preservative free, HIGH-DOSE, age 65y+ (Fluzone) (Completed)          --------------------  Written by Cristy Kowalski RN, acting as a scribe for Dr. Fisher. This note accurately reflects the work and decisions made by   Phillip.     I, Dr. Fisher, attest all medical record entries made by the scribe were under my direction and were personally dictated by me. I have reviewed the chart and agree that the record accurately reflects my performance of the history, physical exam, and assessment and plan.

## 2024-09-17 NOTE — PROGRESS NOTES
Patient ID: Stacy Galicia is a 79 y.o. female.    Ear Cerumen Removal    Date/Time: 9/17/2024 4:09 PM    Performed by: Gertrude Montes LPN  Authorized by: Nick Costa MD    Consent:     Consent obtained:  Verbal    Consent given by:  Patient  Universal protocol:     Procedure explained and questions answered to patient or proxy's satisfaction: yes      Patient identity confirmed:  Verbally with patient  Procedure details:     Location:  L ear and R ear    Procedure type: irrigation      Procedure outcomes: cerumen removed    Post-procedure details:     Inspection:  Ear canal clear    Hearing quality:  Improved    Procedure completion:  Tolerated

## 2024-09-17 NOTE — PATIENT INSTRUCTIONS
It was nice to see you in the office today!  As discussed during our visit...     Please have your blood drawn in the next 1-2 weeks.   We will contact you with the results of your blood work and any necessary adjustments to your plan of care.  If you do not hear from us within 3-5 days of having your blood drawn, please call the [place] office at [phone].     The two closest Outpatient Lab's to this office is:  [facility]  [address] [place] [address]    Hours:   Monday through Friday 7:30am - 4:30pm (Closed 12:30-1pm for lunch)     Or you can go to ...  [place] [id number]  [place] [address] [phone]    Hours:   Monday through Friday 7:30am - 4:30pm (Closed 12:30-1pm for lunch)   Saturday 8am - 12pm    Website to confirm hours and location OR look up more locations ... [phone]

## 2024-09-18 ENCOUNTER — APPOINTMENT (OUTPATIENT)
Dept: PRIMARY CARE | Facility: CLINIC | Age: 79
End: 2024-09-18
Payer: MEDICARE

## 2024-09-24 ENCOUNTER — LAB (OUTPATIENT)
Dept: LAB | Facility: LAB | Age: 79
End: 2024-09-24
Payer: MEDICARE

## 2024-09-24 DIAGNOSIS — E03.9 HYPOTHYROIDISM, UNSPECIFIED TYPE: Primary | ICD-10-CM

## 2024-09-24 DIAGNOSIS — E03.9 HYPOTHYROIDISM, UNSPECIFIED TYPE: ICD-10-CM

## 2024-09-24 LAB
T4 FREE SERPL-MCNC: 1.97 NG/DL (ref 0.61–1.12)
TSH SERPL-ACNC: 0.66 MIU/L (ref 0.44–3.98)

## 2024-09-24 PROCEDURE — 36415 COLL VENOUS BLD VENIPUNCTURE: CPT

## 2024-09-24 RX ORDER — LEVOTHYROXINE SODIUM 150 UG/1
150 TABLET ORAL DAILY
Qty: 90 TABLET | Refills: 1 | Status: SHIPPED | OUTPATIENT
Start: 2024-09-24 | End: 2025-09-24

## 2024-10-16 ENCOUNTER — TELEPHONE (OUTPATIENT)
Dept: PRIMARY CARE | Facility: CLINIC | Age: 79
End: 2024-10-16
Payer: MEDICARE

## 2024-10-16 DIAGNOSIS — J32.9 SINUSITIS, UNSPECIFIED CHRONICITY, UNSPECIFIED LOCATION: ICD-10-CM

## 2024-10-16 DIAGNOSIS — J06.9 UPPER RESPIRATORY TRACT INFECTION, UNSPECIFIED TYPE: Primary | ICD-10-CM

## 2024-10-16 RX ORDER — FLUTICASONE PROPIONATE 50 MCG
1 SPRAY, SUSPENSION (ML) NASAL DAILY
Qty: 16 G | Refills: 0 | Status: SHIPPED | OUTPATIENT
Start: 2024-10-16 | End: 2025-10-16

## 2024-10-16 RX ORDER — GUAIFENESIN 600 MG/1
600 TABLET, EXTENDED RELEASE ORAL 2 TIMES DAILY
Qty: 10 TABLET | Refills: 0 | Status: SHIPPED | OUTPATIENT
Start: 2024-10-16 | End: 2024-10-21

## 2024-10-16 RX ORDER — DOXYCYCLINE 100 MG/1
100 CAPSULE ORAL 2 TIMES DAILY
Qty: 20 CAPSULE | Refills: 0 | Status: SHIPPED | OUTPATIENT
Start: 2024-10-16 | End: 2024-10-26

## 2024-12-11 ENCOUNTER — APPOINTMENT (OUTPATIENT)
Dept: PODIATRY | Facility: CLINIC | Age: 79
End: 2024-12-11
Payer: MEDICARE

## 2025-01-10 ENCOUNTER — APPOINTMENT (OUTPATIENT)
Dept: PRIMARY CARE | Facility: CLINIC | Age: 80
End: 2025-01-10
Payer: MEDICARE

## 2025-01-10 VITALS
BODY MASS INDEX: 17.83 KG/M2 | RESPIRATION RATE: 18 BRPM | DIASTOLIC BLOOD PRESSURE: 89 MMHG | SYSTOLIC BLOOD PRESSURE: 153 MMHG | HEART RATE: 84 BPM | OXYGEN SATURATION: 97 % | HEIGHT: 65 IN | WEIGHT: 107 LBS

## 2025-01-10 DIAGNOSIS — E03.9 HYPOTHYROIDISM, UNSPECIFIED TYPE: ICD-10-CM

## 2025-01-10 DIAGNOSIS — Z00.00 HEALTH CARE MAINTENANCE: ICD-10-CM

## 2025-01-10 DIAGNOSIS — F32.A DEPRESSION, UNSPECIFIED DEPRESSION TYPE: ICD-10-CM

## 2025-01-10 PROCEDURE — 1159F MED LIST DOCD IN RCRD: CPT | Performed by: INTERNAL MEDICINE

## 2025-01-10 PROCEDURE — 1036F TOBACCO NON-USER: CPT | Performed by: INTERNAL MEDICINE

## 2025-01-10 PROCEDURE — 1160F RVW MEDS BY RX/DR IN RCRD: CPT | Performed by: INTERNAL MEDICINE

## 2025-01-10 PROCEDURE — 99213 OFFICE O/P EST LOW 20 MIN: CPT | Performed by: INTERNAL MEDICINE

## 2025-01-10 PROCEDURE — 1126F AMNT PAIN NOTED NONE PRSNT: CPT | Performed by: INTERNAL MEDICINE

## 2025-01-10 ASSESSMENT — ENCOUNTER SYMPTOMS
CONSTITUTIONAL NEGATIVE: 1
EYES NEGATIVE: 1
ENDOCRINE NEGATIVE: 1
GASTROINTESTINAL NEGATIVE: 1
RESPIRATORY NEGATIVE: 1
MUSCULOSKELETAL NEGATIVE: 1
PSYCHIATRIC NEGATIVE: 1
CARDIOVASCULAR NEGATIVE: 1
NEUROLOGICAL NEGATIVE: 1

## 2025-01-10 ASSESSMENT — PAIN SCALES - GENERAL: PAINLEVEL_OUTOF10: 0-NO PAIN

## 2025-01-10 NOTE — PROGRESS NOTES
"Patient ID:   Stacy Galicia is a 79 y.o. female with PMH remarkable for depression, diverticulosis, hypothyroidism who presents to the office today for Follow-up.    HEALTH MAINTENANCE: FOLLOW UP   Last Office Visit: 9/17/2024 for annual medicare visit and c/o ear fullness on left side. Both ears were flushed.  Mammogram (40-75):10/11/2021 -ve  Last Labs: 6/6/2024  DEXA (65+, q 2 years): 9/2020 showing osteopenia of left femoral neck and left total hip.   Echo 10/29/2020 showed LVSF 65-69%, mild pulm HTN     TSH 21.80, Free T4 0.89 on 6/6/2024 (levothyroxine increased) --> TSH 0.66, Free T4 1.97 on 9/24/2024  Does an exercise class on Monday's and enjoys bowling.   Volunteers at the [facility] @ [facility]    Social History     Tobacco Use    Smoking status: Never     Passive exposure: Never    Smokeless tobacco: Never   Substance Use Topics    Alcohol use: Yes     Comment: occasional    Drug use: Never     Review of Systems   Constitutional: Negative.    HENT: Negative.     Eyes: Negative.    Respiratory: Negative.     Cardiovascular: Negative.    Gastrointestinal: Negative.    Endocrine: Negative.    Genitourinary: Negative.    Musculoskeletal: Negative.    Skin: Negative.    Neurological: Negative.    Psychiatric/Behavioral: Negative.     All other systems reviewed and are negative.    Visit Vitals  /89 (BP Location: Left arm, Patient Position: Sitting)   Pulse 84   Resp 18   Ht 1.651 m (5' 5\")   Wt 48.5 kg (107 lb)   SpO2 97%   BMI 17.81 kg/m²   Smoking Status Never   BSA 1.49 m²     Physical Exam  Vitals reviewed. Exam conducted with a chaperone present.   Constitutional:       Appearance: Normal appearance. She is well-developed.   HENT:      Head: Normocephalic.      Right Ear: External ear normal.      Left Ear: External ear normal.      Nose: Nose normal.      Mouth/Throat:      Lips: Pink.      Mouth: Mucous membranes are moist.   Eyes:      General: Lids are normal.      Pupils: Pupils are equal, round, " and reactive to light.   Neck:      Trachea: Trachea normal.   Cardiovascular:      Rate and Rhythm: Normal rate and regular rhythm.      Heart sounds: Normal heart sounds.   Pulmonary:      Effort: Pulmonary effort is normal.      Breath sounds: Normal breath sounds.   Abdominal:      General: Bowel sounds are normal.      Palpations: Abdomen is soft.   Musculoskeletal:      Cervical back: Full passive range of motion without pain.   Skin:     General: Skin is warm and moist.   Neurological:      General: No focal deficit present.      Mental Status: She is alert and oriented to person, place, and time. Mental status is at baseline.   Psychiatric:         Attention and Perception: Attention normal.         Mood and Affect: Mood normal.         Speech: Speech normal.         Behavior: Behavior is cooperative.         Thought Content: Thought content normal.         Cognition and Memory: Cognition normal.         Judgment: Judgment normal.       Current Outpatient Medications   Medication Instructions    citalopram (CeleXA) 10 mg tablet 1 tablet, Daily    fluticasone (Flonase) 50 mcg/actuation nasal spray 1 spray, Each Nostril, Daily, Shake gently. Before first use, prime pump. After use, clean tip and replace cap.    ibuprofen 800 mg, Every 6 hours PRN    levothyroxine (SYNTHROID, LEVOXYL) 150 mcg, oral, Daily, Take on an empty stomach at the same time each day, either 30 to 60 minutes prior to breakfast    traZODone (DESYREL) 50 mg, oral, Nightly      Lab Results   Component Value Date    WBC 4.7 06/06/2024    HGB 14.2 06/06/2024    HCT 43.1 06/06/2024     06/06/2024    CHOL 168 06/06/2024    TRIG 72 06/06/2024    HDL 67.2 06/06/2024    ALT 14 06/06/2024    AST 20 06/06/2024     06/06/2024    K 3.8 06/06/2024     06/06/2024    CREATININE 0.88 06/06/2024    BUN 17 06/06/2024    CO2 29 06/06/2024    TSH 0.66 09/24/2024    INR 1.0 10/28/2020    HGBA1C 5.8 (H) 06/06/2024     Problem List Items  Addressed This Visit             ICD-10-CM    Depression F32.A     - c/w celexa  Depression: Not at risk (1/10/2025)    PHQ-2     PHQ-2 Score: 0             Hypothyroidism E03.9     TSH 21.80, Free T4 0.89 on 6/6/2024 (levothyroxine increased) --> TSH 0.66, Free T4 1.97 on 9/24/2024  - c/w synthroid  - Take levothyroxine on an empty stomach with water alone, 1 hour before eating or taking other medications, 4 hours before any calcium or iron supplement.   - Hold Biotin (all B vitamins, B Complex) for 2 days before any blood draw           Health care maintenance Z00.00     - FU in 6m or sooner if needed          --------------------  Written by Cristy Kowalski RN, acting as a scribe for Dr. Fisher. This note accurately reflects the work and decisions made by Dr. Fisher.     I, Dr. Fisher, attest all medical record entries made by the scribe were under my direction and were personally dictated by me. I have reviewed the chart and agree that the record accurately reflects my performance of the history, physical exam, and assessment and plan.

## 2025-01-13 PROBLEM — Z00.00 HEALTH CARE MAINTENANCE: Status: ACTIVE | Noted: 2025-01-13

## 2025-01-13 NOTE — ASSESSMENT & PLAN NOTE
TSH 21.80, Free T4 0.89 on 6/6/2024 (levothyroxine increased) --> TSH 0.66, Free T4 1.97 on 9/24/2024  - c/w synthroid  - Take levothyroxine on an empty stomach with water alone, 1 hour before eating or taking other medications, 4 hours before any calcium or iron supplement.   - Hold Biotin (all B vitamins, B Complex) for 2 days before any blood draw

## 2025-01-13 NOTE — PATIENT INSTRUCTIONS
It was nice to see you in the office today!  As discussed during our visit...     - Take levothyroxine on an empty stomach with water alone, 1 hour before eating or taking other medications, 4 hours before any calcium or iron supplement.   - Hold Biotin (all B vitamins, B Complex) for 2 days before any blood draw

## 2025-01-15 DIAGNOSIS — F51.01 PRIMARY INSOMNIA: ICD-10-CM

## 2025-01-15 RX ORDER — TRAZODONE HYDROCHLORIDE 50 MG/1
50 TABLET ORAL NIGHTLY
Qty: 90 TABLET | Refills: 1 | Status: SHIPPED | OUTPATIENT
Start: 2025-01-15 | End: 2025-07-14

## 2025-01-21 ENCOUNTER — TELEPHONE (OUTPATIENT)
Dept: PRIMARY CARE | Facility: CLINIC | Age: 80
End: 2025-01-21
Payer: MEDICARE

## 2025-01-21 DIAGNOSIS — J01.90 ACUTE SINUSITIS, RECURRENCE NOT SPECIFIED, UNSPECIFIED LOCATION: ICD-10-CM

## 2025-01-21 DIAGNOSIS — U07.1 COVID-19: ICD-10-CM

## 2025-01-21 RX ORDER — DEXAMETHASONE 4 MG/1
4 TABLET ORAL
Qty: 5 TABLET | Refills: 0 | Status: SHIPPED | OUTPATIENT
Start: 2025-01-21 | End: 2025-01-26

## 2025-01-21 RX ORDER — AMOXICILLIN 500 MG/1
500 CAPSULE ORAL EVERY 8 HOURS SCHEDULED
Qty: 30 CAPSULE | Refills: 0 | Status: SHIPPED | OUTPATIENT
Start: 2025-01-21

## 2025-01-21 NOTE — TELEPHONE ENCOUNTER
Per Dr. Fisher start amox 500mg tid x 10 days   C/w mucinex    Pt just tested for covid now   Do you still want amox

## 2025-01-21 NOTE — TELEPHONE ENCOUNTER
C/o sinus infections, lots of clear phlegm and sore throat   Mucinex not helping     Allergy:  bee venom, sulfa, sulfamethoxazole

## 2025-04-16 ENCOUNTER — TELEPHONE (OUTPATIENT)
Dept: PRIMARY CARE | Facility: CLINIC | Age: 80
End: 2025-04-16
Payer: MEDICARE

## 2025-04-16 DIAGNOSIS — E03.9 HYPOTHYROIDISM, UNSPECIFIED TYPE: Primary | ICD-10-CM

## 2025-04-16 RX ORDER — LEVOTHYROXINE SODIUM 150 UG/1
150 TABLET ORAL DAILY
Qty: 90 TABLET | Refills: 0 | Status: SHIPPED | OUTPATIENT
Start: 2025-04-16 | End: 2026-04-16

## 2025-04-17 NOTE — TELEPHONE ENCOUNTER
Patient informed ok to restart her current dose of thyroid medications. Advised compliance and advised patient to take medication on an empty stomach at the same time each day either 30-60 minutes prior to breakfast.  Patient expressed understanding of instructions.  Advised to repeat thyroid lab test in three months.

## 2025-04-17 NOTE — TELEPHONE ENCOUNTER
Patient can restart on her thyroid medications and we will repeat her labs in three months. Please advise compliance

## 2025-04-17 NOTE — TELEPHONE ENCOUNTER
Patients daughter phoned- patient is not taking her thyroid medications- she stated it has been weeks because she felt she no longer needed it. How should she proceed with restarting the medication?

## 2025-06-04 ENCOUNTER — TELEPHONE (OUTPATIENT)
Dept: PRIMARY CARE | Facility: CLINIC | Age: 80
End: 2025-06-04
Payer: MEDICARE

## 2025-06-04 DIAGNOSIS — J01.90 ACUTE SINUSITIS, RECURRENCE NOT SPECIFIED, UNSPECIFIED LOCATION: ICD-10-CM

## 2025-06-04 DIAGNOSIS — U07.1 COVID-19: ICD-10-CM

## 2025-06-04 RX ORDER — GUAIFENESIN 600 MG/1
600 TABLET, EXTENDED RELEASE ORAL 2 TIMES DAILY
Qty: 10 TABLET | Refills: 0 | Status: SHIPPED | OUTPATIENT
Start: 2025-06-04 | End: 2025-06-09

## 2025-06-04 RX ORDER — FLUTICASONE PROPIONATE 50 MCG
1 SPRAY, SUSPENSION (ML) NASAL DAILY
Qty: 16 G | Refills: 0 | Status: SHIPPED | OUTPATIENT
Start: 2025-06-04 | End: 2026-06-04

## 2025-06-04 RX ORDER — AMOXICILLIN 500 MG/1
500 CAPSULE ORAL EVERY 8 HOURS SCHEDULED
Qty: 30 CAPSULE | Refills: 0 | Status: SHIPPED | OUTPATIENT
Start: 2025-06-04 | End: 2025-06-14

## 2025-07-11 ENCOUNTER — APPOINTMENT (OUTPATIENT)
Dept: PRIMARY CARE | Facility: CLINIC | Age: 80
End: 2025-07-11
Payer: MEDICARE

## 2025-07-15 DIAGNOSIS — F51.01 PRIMARY INSOMNIA: ICD-10-CM

## 2025-07-15 RX ORDER — TRAZODONE HYDROCHLORIDE 50 MG/1
50 TABLET ORAL NIGHTLY
Qty: 90 TABLET | Refills: 0 | Status: SHIPPED | OUTPATIENT
Start: 2025-07-15 | End: 2026-01-11

## 2025-07-16 DIAGNOSIS — E03.9 HYPOTHYROIDISM, UNSPECIFIED TYPE: ICD-10-CM

## 2025-07-20 ENCOUNTER — APPOINTMENT (OUTPATIENT)
Dept: URGENT CARE | Age: 80
End: 2025-07-20
Payer: MEDICARE

## 2025-07-21 ENCOUNTER — OFFICE VISIT (OUTPATIENT)
Dept: URGENT CARE | Age: 80
End: 2025-07-21
Payer: MEDICARE

## 2025-07-21 VITALS
SYSTOLIC BLOOD PRESSURE: 156 MMHG | OXYGEN SATURATION: 98 % | BODY MASS INDEX: 17.64 KG/M2 | DIASTOLIC BLOOD PRESSURE: 90 MMHG | HEART RATE: 79 BPM | RESPIRATION RATE: 16 BRPM | TEMPERATURE: 97.6 F | WEIGHT: 106 LBS

## 2025-07-21 DIAGNOSIS — L23.7 CONTACT DERMATITIS DUE TO POISON IVY: Primary | ICD-10-CM

## 2025-07-21 PROCEDURE — 99213 OFFICE O/P EST LOW 20 MIN: CPT

## 2025-07-21 PROCEDURE — 1036F TOBACCO NON-USER: CPT

## 2025-07-21 PROCEDURE — 1160F RVW MEDS BY RX/DR IN RCRD: CPT

## 2025-07-21 PROCEDURE — 1159F MED LIST DOCD IN RCRD: CPT

## 2025-07-21 RX ORDER — PREDNISONE 10 MG/1
TABLET ORAL
Qty: 30 TABLET | Refills: 0 | Status: SHIPPED | OUTPATIENT
Start: 2025-07-21 | End: 2025-08-02

## 2025-07-21 NOTE — PROGRESS NOTES
Subjective   Patient ID: Stacy Galicia is a 80 y.o. female. They present today with a chief complaint of Rash (For 2 days).    History of Present Illness  80-year-old female presents urgent care for poison ivy bilateral upper extremities and right lower extremity x 2 days.  States she got into poison ivy on Saturday.  Denies any current oral lesions, fevers or chills, nausea, vomiting, sweats, chest pain, shortness of breath, abdominal pain.  States she has had steroids in the past without complication.  Discussed topical steroid versus oral steroid the patient declines topical and would like to 12-day prednisone taper to avoid rebound dermatitis.  Follow-up PCP.  Educated supportive care.  Return/ER precautions.  Patient agrees with plan.          Past Medical History  Allergies as of 07/21/2025 - Reviewed 07/21/2025   Allergen Reaction Noted    Bee venom protein (honey bee) Unknown 07/27/2020    Sulfa (sulfonamide antibiotics) Unknown and Hives 05/08/2018    Sulfamethoxazole-trimethoprim Unknown and Hives 05/08/2018       Prescriptions Prior to Admission[1]     Medical History[2]    Surgical History[3]     reports that she has never smoked. She has never been exposed to tobacco smoke. She has never used smokeless tobacco. She reports current alcohol use. She reports that she does not use drugs.    Review of Systems  Review of Systems   All other systems reviewed and are negative.                                 Objective    Vitals:    07/21/25 1505   BP: 156/90   BP Location: Left arm   Patient Position: Sitting   BP Cuff Size: Adult   Pulse: 79   Resp: 16   Temp: 36.4 °C (97.6 °F)   TempSrc: Oral   SpO2: 98%   Weight: 48.1 kg (106 lb)     No LMP recorded.    Physical Exam  Vitals reviewed.   Constitutional:       General: She is not in acute distress.     Appearance: Normal appearance. She is not ill-appearing, toxic-appearing or diaphoretic.   HENT:      Head: Normocephalic and atraumatic.      Mouth/Throat:       Mouth: Mucous membranes are moist.      Pharynx: Oropharynx is clear.      Comments: No oral lesions.  Airway clear.    Cardiovascular:      Rate and Rhythm: Normal rate and regular rhythm.   Pulmonary:      Effort: Pulmonary effort is normal. No respiratory distress.     Musculoskeletal:      Cervical back: Normal range of motion and neck supple.     Skin:     General: Skin is warm and dry.      Comments: Erythematous papular rash with some vesicles in a linear distribution in the bilateral upper extremities and right lower extremity.  No significant surrounding erythema, and no induration/fluctuance, discharge or active bleeding.     Neurological:      General: No focal deficit present.      Mental Status: She is alert and oriented to person, place, and time.     Psychiatric:         Mood and Affect: Mood normal.         Behavior: Behavior normal.         Procedures    Point of Care Test & Imaging Results from this visit  No results found for this visit on 07/21/25.   Imaging  No results found.    Cardiology, Vascular, and Other Imaging  No other imaging results found for the past 2 days      Diagnostic study results (if any) were reviewed by Sudarshan Smith PA-C.    Assessment/Plan   Allergies, medications, history, and pertinent labs/EKGs/Imaging reviewed by Sudarshan Smith PA-C.     Medical Decision Making  80-year-old female presents urgent care for poison ivy bilateral upper extremities and right lower extremity x 2 days.  States she got into poison ivy on Saturday.  Denies any current oral lesions, fevers or chills, nausea, vomiting, sweats, chest pain, shortness of breath, abdominal pain.  States she has had steroids in the past without complication.  Discussed topical steroid versus oral steroid the patient declines topical and would like to 12-day prednisone taper to avoid rebound dermatitis.  Follow-up PCP.  Educated supportive care.  Return/ER precautions.  Patient agrees with plan.    Orders  and Diagnoses  There are no diagnoses linked to this encounter.    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    Acquired plantar porokeratosis    Acute frontal sinusitis, unspecified 10/14/2019    Acute frontal sinusitis    Acute maxillary sinusitis, unspecified 10/06/2017    Acute maxillary sinusitis    Acute upper respiratory infection, unspecified 10/14/2019    Acute URI    Allergic contact dermatitis due to plants, except food 07/29/2020    Contact dermatitis due to poison ivy    Bee sting 06/27/2023    Bitten by cat, initial encounter 11/09/2020    Cat bite    Bitten or stung by nonvenomous insect and other nonvenomous arthropods, initial encounter 07/23/2019    Reaction to insect bite    Bitten or stung by nonvenomous insect and other nonvenomous arthropods, initial encounter 07/23/2019    Insect bite of multiple sites with local reaction    Cat bite 01/24/2024    Cramp and spasm 08/26/2016    Cramp of both lower extremities    Encounter for immunization 08/31/2020    Influenza vaccination administered at current visit    Impacted cerumen, bilateral 09/22/2015    Impacted cerumen of both ears    Insect bite (nonvenomous) of left upper arm, initial encounter (CODE) 08/04/2014    Insect bite of arm, left    Leg cramping 02/24/2023    Lightheadedness 01/24/2024    Lumbago with sciatica, right side 05/30/2018    Acute right-sided low back pain with right-sided sciatica    Muscle spasm of back 05/15/2018    Spasm of lumbar paraspinous muscle    Mycotic toenails 02/24/2023    Nausea 06/27/2023    Near syncope 01/24/2024    Open bite of left hand, initial encounter 08/07/2020    Cat bite of left hand, initial encounter    Open bite of left hand, subsequent encounter 08/31/2020    Cat bite of  hand, left, subsequent encounter    Open bite of right hand, subsequent encounter 03/27/2017    Cat bite of right hand, subsequent encounter    Open bite of right wrist, initial encounter 03/17/2017    Cat bite of right wrist, initial encounter    Open bite of right wrist, initial encounter 03/17/2017    Cat bite of right wrist with infection, initial encounter    Other conditions influencing health status     Nonvenomous Insect Bite                                                                                                                                                          Other specified anxiety disorders 06/18/2021    Depression with anxiety    Pain in left hand     Hand pain, left    Pain in left hand 06/07/2017    Pain of left hand    Pain in unspecified hip 07/15/2015    Hip pain    Pelvic and perineal pain 03/10/2016    Suprapubic pain    Personal history of diseases of the skin and subcutaneous tissue 12/14/2020    History of dermatitis    Personal history of diseases of the skin and subcutaneous tissue 08/29/2018    History of dermatitis    Personal history of other (healed) physical injury and trauma     History of insect bite    Personal history of other diseases of the musculoskeletal system and connective tissue 03/25/2014    History of lateral epicondylitis    Personal history of other diseases of the respiratory system 10/06/2017    History of acute pharyngitis    Personal history of other diseases of the respiratory system 01/29/2016    History of upper respiratory infection    Personal history of other diseases of the respiratory system 10/06/2017    History of acute bronchitis    Personal history of other infectious and parasitic diseases 06/13/2019    History of scabies    Personal history of other infectious and parasitic diseases     History of herpes zoster    Personal history of other specified conditions 08/14/2015    History of abdominal pain    Personal history of other specified  conditions 06/18/2021    History of fatigue    Rash and other nonspecific skin eruption     Rash    Sciatica, left side 07/24/2015    Sciatica of left side    Scratched by cat, initial encounter 11/02/2020    Cat scratch    Sore throat 06/27/2023    Strain of unspecified muscle(s) and tendon(s) at lower leg level, right leg, initial encounter 12/09/2019    Knee strain, right, initial encounter    Syncope and collapse 02/18/2014    Syncope and collapse    T12 compression fracture, initial encounter (Multi) 02/24/2023    Toxic effect of venom of bees, accidental (unintentional), initial encounter 09/09/2020    Local reaction to bee sting, accidental or unintentional, initial encounter    Toxic effect of venom of bees, accidental (unintentional), initial encounter 09/09/2020    Bee sting, accidental or unintentional, initial encounter    Toxic effect of venom of bees, accidental (unintentional), initial encounter 09/09/2020    Local reaction to bee sting, accidental or unintentional, initial encounter    Unspecified contact dermatitis due to plants, except food 06/14/2016    Contact dermatitis due to plant   [3]   Past Surgical History:  Procedure Laterality Date    OTHER SURGICAL HISTORY  12/12/2013    Peripheral Nerve Block [place] (Bunion) Left Foot    OTHER SURGICAL HISTORY  12/12/2013    Peripheral Nerve Block [place] (Bunion) Right Foot

## 2025-07-21 NOTE — PATIENT INSTRUCTIONS
Take medications as prescribed.  Can also take Zyrtec following over-the-counter dosing instructions for itching.  Keep skin clean with soap and water.  Avoid hot showers as this can make the itching worse.  Try to take cool/lukewarm showers.  Make sure you wash everything you have touched that have the poison ivy oil on it including sheets, close, pets.  Maintain adequate hydration and nutrition.  If symptoms worsen, do not improve, or any other concerning or worrisome symptoms develop please return to the clinic or go to the emergency department.  Follow-up with PCP in 1 to 2 weeks for recheck.

## 2025-07-22 ENCOUNTER — TELEPHONE (OUTPATIENT)
Dept: URGENT CARE | Age: 80
End: 2025-07-22

## 2025-08-06 PROBLEM — R41.0 CONFUSION: Status: ACTIVE | Noted: 2025-08-06

## 2025-08-06 NOTE — ASSESSMENT & PLAN NOTE
- No focal deficits on exam concerning for CVA, pt ambulatory in office today without assistance   - No recent med changes per patient   - Pt unable to give urine sample for UA in office today   - Mini mental exam completed, 26/30 (mild impairment)  - Check TSH, T4- pt has active orders from Dr. Fisher  - CBC, CMP, Vit D ordered- not done since June 2024  - Will re-start citalopram for depression

## 2025-08-06 NOTE — PROGRESS NOTES
"Patient ID:   Stacy Galicia is a 80 y.o. female with PMH remarkable for depression, diverticulosis, hypothyroidism who presents to the office today for Altered Mental Status (Daughter states that she is more confused lately) and Depression (Depressed since retired).    Last Office Visit: 1/10/25 with Dr. Fisher     HPI:    Pt is prescribed levothyroxine for hypothyroidism; per 4/17/25 phone note, she stopped taking it for several months and was advised to restart levothyroxine by Dr. Fisher. She reportedly restarted taking levothyroxine in April, has not had repeat TSH/T4    Normal CT brain in 2020    Stacy presents to the office today for reported confusion and depression. Per staff, her daughter called to make her an appointment because she was concerned that the patient has been more confused recently. Stacy arrives alone today and says her daughter made her an appointment \"because she thinks I need psychotic drugs\".  She states that her daughter moved in with her about a year ago due to some personal issues and she \"liked living alone better\". Stacy is answering questions appropriately and recalls the names of various office staff members today. She denies any recent falls. She states she has been taking levothyroxine as prescribed but she is not taking citalopram; she is unsure when or why she stopped taking it. She notes that she has been more depressed since she was let go from her job at the [employer] as they were not able to pay her anymore. She has worked her whole life and is struggling with being less active. 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 She is still doing plenty of activities and reports she recently got back from [place] with a friend. She feels that she is in good health overall and says she is not letting depression limit her.       Social History     Tobacco Use    Smoking " "status: Never     Passive exposure: Never    Smokeless tobacco: Never   Substance Use Topics    Alcohol use: Yes     Comment: occasional      Review of Systems   Constitutional:  Negative for activity change, appetite change, chills and fever.   HENT:  Negative for congestion.    Respiratory:  Negative for cough and shortness of breath.    Cardiovascular:  Negative for chest pain and leg swelling.   Gastrointestinal:  Negative for abdominal pain, constipation, diarrhea, nausea and vomiting.   Genitourinary:  Negative for difficulty urinating, dysuria and frequency.   Musculoskeletal:  Negative for joint swelling.   Skin:  Negative for rash.   Neurological:  Negative for dizziness, syncope, weakness and light-headedness.   Psychiatric/Behavioral:  Positive for dysphoric mood. Negative for confusion, sleep disturbance and suicidal ideas. The patient is not nervous/anxious.    All other systems reviewed and are negative.    Visit Vitals  /78 (BP Location: Left arm, Patient Position: Sitting)   Pulse 81   Resp 18   Ht 1.651 m (5' 5\")   Wt 48.1 kg (106 lb)   SpO2 97%   BMI 17.64 kg/m²   Smoking Status Never   BSA 1.49 m²     Allergies[1]   Physical Exam  Vitals and nursing note reviewed.   Constitutional:       General: She is not in acute distress.     Appearance: She is not toxic-appearing.      Comments: Ambulatory without assistance    HENT:      Head: Normocephalic and atraumatic.      Mouth/Throat:      Mouth: Mucous membranes are moist.     Eyes:      General: No scleral icterus.     Conjunctiva/sclera: Conjunctivae normal.       Cardiovascular:      Rate and Rhythm: Normal rate and regular rhythm.   Pulmonary:      Effort: Pulmonary effort is normal. No respiratory distress.      Breath sounds: Normal breath sounds.   Abdominal:      General: There is no distension.      Palpations: Abdomen is soft.      Tenderness: There is no abdominal tenderness.     Musculoskeletal:         General: No swelling.      " Cervical back: Normal range of motion.     Skin:     General: Skin is warm and dry.     Neurological:      Mental Status: She is alert and oriented to person, place, and time. Mental status is at baseline.      Motor: No weakness.      Gait: Gait normal.     Psychiatric:         Mood and Affect: Mood normal.         Behavior: Behavior normal.       Current Outpatient Medications   Medication Instructions    citalopram (CELEXA) 10 mg, oral, Daily    dexAMETHasone (DECADRON) 4 mg, oral, Daily with breakfast    fluticasone (Flonase) 50 mcg/actuation nasal spray 1 spray, Each Nostril, Daily, Shake gently. Before first use, prime pump. After use, clean tip and replace cap.    levothyroxine (SYNTHROID, LEVOXYL) 150 mcg, oral, Daily, Take on an empty stomach at the same time each day, either 30 to 60 minutes prior to breakfast    traZODone (DESYREL) 50 mg, oral, Nightly      Lab Results   Component Value Date    WBC 4.7 06/06/2024    HGB 14.2 06/06/2024    HCT 43.1 06/06/2024     06/06/2024    CHOL 168 06/06/2024    TRIG 72 06/06/2024    HDL 67.2 06/06/2024    ALT 14 06/06/2024    AST 20 06/06/2024     06/06/2024    K 3.8 06/06/2024     06/06/2024    CREATININE 0.88 06/06/2024    BUN 17 06/06/2024    CO2 29 06/06/2024    TSH 0.66 09/24/2024    INR 1.0 10/28/2020    HGBA1C 5.8 (H) 06/06/2024       Assessment/Plan   Problem List Items Addressed This Visit           ICD-10-CM       Endocrine/Metabolic    Hypothyroidism E03.9    - Pt reports compliance; levothyroxine last filled 4/15/25 for 90 day supply  - Refill sent today, pt counseled on the importance of taking as prescribed  - Has active orders for TSH and T4, pt encouraged to have these labs completed         Relevant Medications    levothyroxine (Synthroid, Levoxyl) 150 mcg tablet    Osteopenia of left femoral neck M85.852    Relevant Orders    Vitamin D 25-Hydroxy,Total (for eval of Vitamin D levels)       Mental Health    Depression F32.A    -  Prescribed citalopram 10 mg daily, no recent fill history  - Discussed with pt today and she is agreeable to restarting - Rx sent             Relevant Medications    levothyroxine (Synthroid, Levoxyl) 150 mcg tablet    citalopram (CeleXA) 10 mg tablet       Neuro    Confusion - Primary R41.0    - No focal deficits on exam concerning for CVA, pt ambulatory in office today without assistance   - No recent med changes per patient   - Pt unable to give urine sample for UA in office today   - Mini mental exam completed, 26/30 (mild impairment)  - Check TSH, T4- pt has active orders from Dr. Fisher  - CBC, CMP, Vit D ordered- not done since June 2024  - Will re-start citalopram for depression            Relevant Orders    CBC and Auto Differential    Comprehensive metabolic panel    Vitamin D 25-Hydroxy,Total (for eval of Vitamin D levels)    Vitamin B12     ------         [1]   Allergies  Allergen Reactions    Bee Venom Protein (Honey Bee) Unknown    Sulfa (Sulfonamide Antibiotics) Unknown and Hives    Sulfamethoxazole-Trimethoprim Unknown and Hives

## 2025-08-07 ENCOUNTER — APPOINTMENT (OUTPATIENT)
Dept: PRIMARY CARE | Facility: CLINIC | Age: 80
End: 2025-08-07
Payer: MEDICARE

## 2025-08-07 VITALS
DIASTOLIC BLOOD PRESSURE: 78 MMHG | HEIGHT: 65 IN | SYSTOLIC BLOOD PRESSURE: 160 MMHG | OXYGEN SATURATION: 97 % | HEART RATE: 81 BPM | BODY MASS INDEX: 17.66 KG/M2 | RESPIRATION RATE: 18 BRPM | WEIGHT: 106 LBS

## 2025-08-07 DIAGNOSIS — R41.0 CONFUSION: Primary | ICD-10-CM

## 2025-08-07 DIAGNOSIS — E03.9 HYPOTHYROIDISM, UNSPECIFIED TYPE: ICD-10-CM

## 2025-08-07 DIAGNOSIS — F32.A DEPRESSION, UNSPECIFIED DEPRESSION TYPE: ICD-10-CM

## 2025-08-07 DIAGNOSIS — M85.852 OSTEOPENIA OF LEFT FEMORAL NECK: ICD-10-CM

## 2025-08-07 PROCEDURE — 1160F RVW MEDS BY RX/DR IN RCRD: CPT

## 2025-08-07 PROCEDURE — 1036F TOBACCO NON-USER: CPT

## 2025-08-07 PROCEDURE — 99214 OFFICE O/P EST MOD 30 MIN: CPT

## 2025-08-07 PROCEDURE — 1159F MED LIST DOCD IN RCRD: CPT

## 2025-08-07 PROCEDURE — 1126F AMNT PAIN NOTED NONE PRSNT: CPT

## 2025-08-07 RX ORDER — CITALOPRAM 10 MG/1
10 TABLET ORAL DAILY
Qty: 30 TABLET | Refills: 2 | Status: SHIPPED | OUTPATIENT
Start: 2025-08-07

## 2025-08-07 RX ORDER — LEVOTHYROXINE SODIUM 150 UG/1
150 TABLET ORAL DAILY
Qty: 90 TABLET | Refills: 0 | Status: SHIPPED | OUTPATIENT
Start: 2025-08-07

## 2025-08-07 ASSESSMENT — ENCOUNTER SYMPTOMS
APPETITE CHANGE: 0
ACTIVITY CHANGE: 0
CHILLS: 0
DIFFICULTY URINATING: 0
FREQUENCY: 0
NERVOUS/ANXIOUS: 0
LIGHT-HEADEDNESS: 0
CONFUSION: 0
FEVER: 0
DIZZINESS: 0
CONSTIPATION: 0
WEAKNESS: 0
COUGH: 0
SLEEP DISTURBANCE: 0
NAUSEA: 0
DYSURIA: 0
ABDOMINAL PAIN: 0
DYSPHORIC MOOD: 1
VOMITING: 0
DIARRHEA: 0
SHORTNESS OF BREATH: 0
JOINT SWELLING: 0

## 2025-08-07 ASSESSMENT — PATIENT HEALTH QUESTIONNAIRE - PHQ9
SUM OF ALL RESPONSES TO PHQ9 QUESTIONS 1 AND 2: 1
2. FEELING DOWN, DEPRESSED OR HOPELESS: SEVERAL DAYS
1. LITTLE INTEREST OR PLEASURE IN DOING THINGS: NOT AT ALL

## 2025-08-07 ASSESSMENT — PAIN SCALES - GENERAL: PAINLEVEL_OUTOF10: 0-NO PAIN

## 2025-08-07 NOTE — ASSESSMENT & PLAN NOTE
- Prescribed citalopram 10 mg daily, no recent fill history  - Discussed with pt today and she is agreeable to restarting - Rx sent

## 2025-08-07 NOTE — ASSESSMENT & PLAN NOTE
- Pt reports compliance; levothyroxine last filled 4/15/25 for 90 day supply  - Refill sent today, pt counseled on the importance of taking as prescribed  - Has active orders for TSH and T4, pt encouraged to have these labs completed

## 2025-08-11 LAB
25(OH)D3+25(OH)D2 SERPL-MCNC: 36 NG/ML (ref 30–100)
ALBUMIN SERPL-MCNC: 3.8 G/DL (ref 3.6–5.1)
ALP SERPL-CCNC: 70 U/L (ref 37–153)
ALT SERPL-CCNC: 15 U/L (ref 6–29)
ANION GAP SERPL CALCULATED.4IONS-SCNC: 9 MMOL/L (CALC) (ref 7–17)
AST SERPL-CCNC: 17 U/L (ref 10–35)
BASOPHILS # BLD AUTO: 70 CELLS/UL (ref 0–200)
BASOPHILS NFR BLD AUTO: 1.3 %
BILIRUB SERPL-MCNC: 1.3 MG/DL (ref 0.2–1.2)
BUN SERPL-MCNC: 18 MG/DL (ref 7–25)
CALCIUM SERPL-MCNC: 8.9 MG/DL (ref 8.6–10.4)
CHLORIDE SERPL-SCNC: 107 MMOL/L (ref 98–110)
CO2 SERPL-SCNC: 26 MMOL/L (ref 20–32)
CREAT SERPL-MCNC: 0.9 MG/DL (ref 0.6–1)
EGFRCR SERPLBLD CKD-EPI 2021: 65 ML/MIN/1.73M2
EOSINOPHIL # BLD AUTO: 92 CELLS/UL (ref 15–500)
EOSINOPHIL NFR BLD AUTO: 1.7 %
ERYTHROCYTE [DISTWIDTH] IN BLOOD BY AUTOMATED COUNT: 12.5 % (ref 11–15)
GLUCOSE SERPL-MCNC: 139 MG/DL (ref 65–99)
HCT VFR BLD AUTO: 41.5 % (ref 35–45)
HGB BLD-MCNC: 13.6 G/DL (ref 11.7–15.5)
LYMPHOCYTES # BLD AUTO: 1463 CELLS/UL (ref 850–3900)
LYMPHOCYTES NFR BLD AUTO: 27.1 %
MCH RBC QN AUTO: 30.4 PG (ref 27–33)
MCHC RBC AUTO-ENTMCNC: 32.8 G/DL (ref 32–36)
MCV RBC AUTO: 92.6 FL (ref 80–100)
MONOCYTES # BLD AUTO: 896 CELLS/UL (ref 200–950)
MONOCYTES NFR BLD AUTO: 16.6 %
NEUTROPHILS # BLD AUTO: 2878 CELLS/UL (ref 1500–7800)
NEUTROPHILS NFR BLD AUTO: 53.3 %
PLATELET # BLD AUTO: 259 THOUSAND/UL (ref 140–400)
PMV BLD REES-ECKER: 9.6 FL (ref 7.5–12.5)
POTASSIUM SERPL-SCNC: 3.8 MMOL/L (ref 3.5–5.3)
PROT SERPL-MCNC: 5.7 G/DL (ref 6.1–8.1)
RBC # BLD AUTO: 4.48 MILLION/UL (ref 3.8–5.1)
SODIUM SERPL-SCNC: 142 MMOL/L (ref 135–146)
T4 FREE SERPL-MCNC: 1.8 NG/DL (ref 0.8–1.8)
TSH SERPL-ACNC: 0.2 MIU/L (ref 0.4–4.5)
VIT B12 SERPL-MCNC: 263 PG/ML (ref 200–1100)
WBC # BLD AUTO: 5.4 THOUSAND/UL (ref 3.8–10.8)

## 2025-08-22 ENCOUNTER — APPOINTMENT (OUTPATIENT)
Dept: PRIMARY CARE | Facility: CLINIC | Age: 80
End: 2025-08-22
Payer: MEDICARE